# Patient Record
Sex: FEMALE | Race: WHITE | NOT HISPANIC OR LATINO | Employment: OTHER | URBAN - METROPOLITAN AREA
[De-identification: names, ages, dates, MRNs, and addresses within clinical notes are randomized per-mention and may not be internally consistent; named-entity substitution may affect disease eponyms.]

---

## 2017-04-28 ENCOUNTER — HISTORICAL (OUTPATIENT)
Dept: INTERNAL MEDICINE | Facility: CLINIC | Age: 62
End: 2017-04-28

## 2017-04-28 LAB
ABS NEUT (OLG): 4.68 X10(3)/MCL (ref 2.1–9.2)
ALBUMIN SERPL-MCNC: 4 GM/DL (ref 3.4–5)
ALBUMIN/GLOB SERPL: 1 RATIO (ref 1–2)
ALP SERPL-CCNC: 89 UNIT/L (ref 20–120)
ALT SERPL-CCNC: 30 UNIT/L
AST SERPL-CCNC: 25 UNIT/L
BASOPHILS # BLD AUTO: 0.02 X10(3)/MCL
BASOPHILS NFR BLD AUTO: 0 % (ref 0–1)
BILIRUB SERPL-MCNC: 0.8 MG/DL
BILIRUBIN DIRECT+TOT PNL SERPL-MCNC: <0.1 MG/DL
BILIRUBIN DIRECT+TOT PNL SERPL-MCNC: >0.7 MG/DL
BUN SERPL-MCNC: 12 MG/DL (ref 7–25)
CALCIUM SERPL-MCNC: 9.3 MG/DL (ref 8.4–10.3)
CHLORIDE SERPL-SCNC: 102 MMOL/L (ref 96–110)
CHOLEST SERPL-MCNC: 203 MG/DL
CHOLEST/HDLC SERPL: 4.2 {RATIO} (ref 0–4.4)
CO2 SERPL-SCNC: 27 MMOL/L (ref 24–32)
CREAT SERPL-MCNC: 0.6 MG/DL (ref 0.7–1.1)
EOSINOPHIL # BLD AUTO: 0.13 X10(3)/MCL
EOSINOPHIL NFR BLD AUTO: 2 % (ref 0–5)
ERYTHROCYTE [DISTWIDTH] IN BLOOD BY AUTOMATED COUNT: 11.9 % (ref 11.5–14.5)
EST. AVERAGE GLUCOSE BLD GHB EST-MCNC: 189 MG/DL
GLOBULIN SER-MCNC: 3.1 GM/ML (ref 2.3–3.5)
GLUCOSE SERPL-MCNC: 160 MG/DL (ref 65–99)
HBA1C MFR BLD: 8.2 % (ref 4.7–5.6)
HCT VFR BLD AUTO: 41.4 % (ref 35–46)
HDLC SERPL-MCNC: 48 MG/DL
HGB BLD-MCNC: 13.9 GM/DL (ref 12–16)
IMM GRANULOCYTES # BLD AUTO: 0.01 10*3/UL
IMM GRANULOCYTES NFR BLD AUTO: 0 %
LDLC SERPL CALC-MCNC: 112 MG/DL (ref 0–130)
LYMPHOCYTES # BLD AUTO: 3 X10(3)/MCL
LYMPHOCYTES NFR BLD AUTO: 36 % (ref 15–40)
MCH RBC QN AUTO: 31 PG (ref 26–34)
MCHC RBC AUTO-ENTMCNC: 33.6 GM/DL (ref 31–37)
MCV RBC AUTO: 92.2 FL (ref 80–100)
MONOCYTES # BLD AUTO: 0.51 X10(3)/MCL
MONOCYTES NFR BLD AUTO: 6 % (ref 4–12)
NEUTROPHILS # BLD AUTO: 4.68 X10(3)/MCL
NEUTROPHILS NFR BLD AUTO: 56 X10(3)/MCL
PLATELET # BLD AUTO: 312 X10(3)/MCL (ref 130–400)
PMV BLD AUTO: 9.9 FL (ref 7.4–10.4)
POTASSIUM SERPL-SCNC: 4.4 MMOL/L (ref 3.6–5.2)
PROT SERPL-MCNC: 7.1 GM/DL (ref 6–8)
RBC # BLD AUTO: 4.49 X10(6)/MCL (ref 4–5.2)
SODIUM SERPL-SCNC: 138 MMOL/L (ref 135–146)
TRIGL SERPL-MCNC: 217 MG/DL
TSH SERPL-ACNC: 1 MIU/L (ref 0.5–5)
VLDLC SERPL CALC-MCNC: 43 MG/DL
WBC # SPEC AUTO: 8.4 X10(3)/MCL (ref 4.5–11)

## 2017-06-20 LAB
COLOR STL: NORMAL
CONSISTENCY STL: NORMAL
HEMOCCULT SP2 STL QL: NEGATIVE

## 2017-06-21 LAB
COLOR STL: NORMAL
CONSISTENCY STL: NORMAL
HEMOCCULT SP1 STL QL: NEGATIVE

## 2017-06-22 LAB
COLOR STL: NORMAL
CONSISTENCY STL: NORMAL

## 2017-06-23 ENCOUNTER — HISTORICAL (OUTPATIENT)
Dept: INTERNAL MEDICINE | Facility: CLINIC | Age: 62
End: 2017-06-23

## 2018-05-02 ENCOUNTER — HISTORICAL (OUTPATIENT)
Dept: ADMINISTRATIVE | Facility: HOSPITAL | Age: 63
End: 2018-05-02

## 2018-05-02 LAB
EST. AVERAGE GLUCOSE BLD GHB EST-MCNC: 206 MG/DL
HBA1C MFR BLD: 8.8 % (ref 4.2–6.3)

## 2018-05-23 ENCOUNTER — HISTORICAL (OUTPATIENT)
Dept: INTERNAL MEDICINE | Facility: CLINIC | Age: 63
End: 2018-05-23

## 2018-06-19 ENCOUNTER — HISTORICAL (OUTPATIENT)
Dept: INTERNAL MEDICINE | Facility: CLINIC | Age: 63
End: 2018-06-19

## 2018-08-17 ENCOUNTER — HISTORICAL (OUTPATIENT)
Dept: INTERNAL MEDICINE | Facility: CLINIC | Age: 63
End: 2018-08-17

## 2018-08-17 LAB
ABS NEUT (OLG): 7.63 X10(3)/MCL (ref 2.1–9.2)
ALBUMIN SERPL-MCNC: 3.7 GM/DL (ref 3.4–5)
ALBUMIN/GLOB SERPL: 1 RATIO (ref 1–2)
ALP SERPL-CCNC: 122 UNIT/L (ref 45–117)
ALT SERPL-CCNC: 33 UNIT/L (ref 12–78)
AST SERPL-CCNC: 17 UNIT/L (ref 15–37)
BASOPHILS # BLD AUTO: 0.03 X10(3)/MCL
BASOPHILS NFR BLD AUTO: 0 %
BILIRUB SERPL-MCNC: 0.8 MG/DL (ref 0.2–1)
BILIRUBIN DIRECT+TOT PNL SERPL-MCNC: 0.2 MG/DL
BILIRUBIN DIRECT+TOT PNL SERPL-MCNC: 0.6 MG/DL
BUN SERPL-MCNC: 11 MG/DL (ref 7–18)
CALCIUM SERPL-MCNC: 9.2 MG/DL (ref 8.5–10.1)
CHLORIDE SERPL-SCNC: 101 MMOL/L (ref 98–107)
CHOLEST SERPL-MCNC: 169 MG/DL
CHOLEST/HDLC SERPL: 3.7 {RATIO} (ref 0–4.4)
CO2 SERPL-SCNC: 31 MMOL/L (ref 21–32)
CREAT SERPL-MCNC: 0.7 MG/DL (ref 0.6–1.3)
EOSINOPHIL # BLD AUTO: 0.14 X10(3)/MCL
EOSINOPHIL NFR BLD AUTO: 1 %
ERYTHROCYTE [DISTWIDTH] IN BLOOD BY AUTOMATED COUNT: 11.6 % (ref 11.5–14.5)
EST. AVERAGE GLUCOSE BLD GHB EST-MCNC: 189 MG/DL
GLOBULIN SER-MCNC: 4.3 GM/ML (ref 2.3–3.5)
GLUCOSE SERPL-MCNC: 188 MG/DL (ref 74–106)
HBA1C MFR BLD: 8.2 % (ref 4.2–6.3)
HCT VFR BLD AUTO: 43.1 % (ref 35–46)
HDLC SERPL-MCNC: 46 MG/DL
HGB BLD-MCNC: 14.3 GM/DL (ref 12–16)
IMM GRANULOCYTES # BLD AUTO: 0.03 10*3/UL
IMM GRANULOCYTES NFR BLD AUTO: 0 %
LDLC SERPL CALC-MCNC: 78 MG/DL (ref 0–130)
LYMPHOCYTES # BLD AUTO: 3.8 X10(3)/MCL
LYMPHOCYTES NFR BLD AUTO: 31 % (ref 13–40)
MCH RBC QN AUTO: 31 PG (ref 26–34)
MCHC RBC AUTO-ENTMCNC: 33.2 GM/DL (ref 31–37)
MCV RBC AUTO: 93.3 FL (ref 80–100)
MONOCYTES # BLD AUTO: 0.58 X10(3)/MCL
MONOCYTES NFR BLD AUTO: 5 % (ref 4–12)
NEUTROPHILS # BLD AUTO: 7.63 X10(3)/MCL
NEUTROPHILS NFR BLD AUTO: 63 X10(3)/MCL
PLATELET # BLD AUTO: 330 X10(3)/MCL (ref 130–400)
PMV BLD AUTO: 10.1 FL (ref 7.4–10.4)
POTASSIUM SERPL-SCNC: 4.7 MMOL/L (ref 3.5–5.1)
PROT SERPL-MCNC: 8 GM/DL (ref 6.4–8.2)
RBC # BLD AUTO: 4.62 X10(6)/MCL (ref 4–5.2)
SODIUM SERPL-SCNC: 138 MMOL/L (ref 136–145)
TRIGL SERPL-MCNC: 225 MG/DL
VLDLC SERPL CALC-MCNC: 45 MG/DL
WBC # SPEC AUTO: 12.2 X10(3)/MCL (ref 4.5–11)

## 2019-09-10 ENCOUNTER — HISTORICAL (OUTPATIENT)
Dept: RADIOLOGY | Facility: HOSPITAL | Age: 64
End: 2019-09-10

## 2019-09-10 LAB
ABS NEUT (OLG): 5.07 X10(3)/MCL (ref 2.1–9.2)
ALBUMIN SERPL-MCNC: 3.8 GM/DL (ref 3.4–5)
ALBUMIN/GLOB SERPL: 1.1 RATIO (ref 1.1–2)
ALP SERPL-CCNC: 109 UNIT/L (ref 45–117)
ALT SERPL-CCNC: 40 UNIT/L (ref 12–78)
AST SERPL-CCNC: 18 UNIT/L (ref 15–37)
BASOPHILS # BLD AUTO: 0 X10(3)/MCL (ref 0–0.2)
BASOPHILS NFR BLD AUTO: 0 %
BILIRUB SERPL-MCNC: 0.6 MG/DL (ref 0.2–1)
BILIRUBIN DIRECT+TOT PNL SERPL-MCNC: 0.1 MG/DL
BILIRUBIN DIRECT+TOT PNL SERPL-MCNC: 0.5 MG/DL
BUN SERPL-MCNC: 11 MG/DL (ref 7–18)
CALCIUM SERPL-MCNC: 9.2 MG/DL (ref 8.5–10.1)
CHLORIDE SERPL-SCNC: 106 MMOL/L (ref 98–107)
CHOLEST SERPL-MCNC: 203 MG/DL
CHOLEST/HDLC SERPL: 4.2 {RATIO} (ref 0–4.4)
CO2 SERPL-SCNC: 33 MMOL/L (ref 21–32)
CREAT SERPL-MCNC: 0.7 MG/DL (ref 0.6–1.3)
EOSINOPHIL # BLD AUTO: 0.2 X10(3)/MCL (ref 0–0.9)
EOSINOPHIL NFR BLD AUTO: 2 %
ERYTHROCYTE [DISTWIDTH] IN BLOOD BY AUTOMATED COUNT: 12.4 % (ref 11.5–14.5)
EST. AVERAGE GLUCOSE BLD GHB EST-MCNC: 160 MG/DL
GLOBULIN SER-MCNC: 3.5 GM/ML (ref 2.3–3.5)
GLUCOSE SERPL-MCNC: 163 MG/DL (ref 74–106)
HBA1C MFR BLD: 7.2 % (ref 4.2–6.3)
HCT VFR BLD AUTO: 43.3 % (ref 35–46)
HDLC SERPL-MCNC: 48 MG/DL (ref 40–59)
HGB BLD-MCNC: 14.2 GM/DL (ref 12–16)
IMM GRANULOCYTES # BLD AUTO: 0.02 10*3/UL
IMM GRANULOCYTES NFR BLD AUTO: 0 %
LDLC SERPL CALC-MCNC: 109 MG/DL
LYMPHOCYTES # BLD AUTO: 3.9 X10(3)/MCL (ref 0.6–4.6)
LYMPHOCYTES NFR BLD AUTO: 40 %
MCH RBC QN AUTO: 31 PG (ref 26–34)
MCHC RBC AUTO-ENTMCNC: 32.8 GM/DL (ref 31–37)
MCV RBC AUTO: 94.5 FL (ref 80–100)
MONOCYTES # BLD AUTO: 0.6 X10(3)/MCL (ref 0.1–1.3)
MONOCYTES NFR BLD AUTO: 6 %
NEUTROPHILS # BLD AUTO: 5.07 X10(3)/MCL (ref 2.1–9.2)
NEUTROPHILS NFR BLD AUTO: 52 %
PLATELET # BLD AUTO: 297 X10(3)/MCL (ref 130–400)
PMV BLD AUTO: 9.3 FL (ref 7.4–10.4)
POTASSIUM SERPL-SCNC: 5 MMOL/L (ref 3.5–5.1)
PROT SERPL-MCNC: 7.3 GM/DL (ref 6.4–8.2)
RBC # BLD AUTO: 4.58 X10(6)/MCL (ref 4–5.2)
SODIUM SERPL-SCNC: 140 MMOL/L (ref 136–145)
TRIGL SERPL-MCNC: 229 MG/DL
VLDLC SERPL CALC-MCNC: 46 MG/DL
WBC # SPEC AUTO: 9.8 X10(3)/MCL (ref 4.5–11)

## 2019-10-07 ENCOUNTER — HISTORICAL (OUTPATIENT)
Dept: ADMINISTRATIVE | Facility: HOSPITAL | Age: 64
End: 2019-10-07

## 2020-02-26 ENCOUNTER — HISTORICAL (OUTPATIENT)
Dept: ADMINISTRATIVE | Facility: HOSPITAL | Age: 65
End: 2020-02-26

## 2020-03-05 ENCOUNTER — HISTORICAL (OUTPATIENT)
Dept: SURGERY | Facility: HOSPITAL | Age: 65
End: 2020-03-05

## 2020-03-18 ENCOUNTER — HISTORICAL (OUTPATIENT)
Dept: ADMINISTRATIVE | Facility: HOSPITAL | Age: 65
End: 2020-03-18

## 2020-11-11 ENCOUNTER — HISTORICAL (OUTPATIENT)
Dept: ADMINISTRATIVE | Facility: HOSPITAL | Age: 65
End: 2020-11-11

## 2020-11-11 LAB
FLUAV AG UPPER RESP QL IA.RAPID: NEGATIVE
FLUBV AG UPPER RESP QL IA.RAPID: NEGATIVE

## 2020-11-19 ENCOUNTER — HISTORICAL (OUTPATIENT)
Dept: ADMINISTRATIVE | Facility: HOSPITAL | Age: 65
End: 2020-11-19

## 2020-11-19 LAB
FLUAV AG UPPER RESP QL IA.RAPID: NEGATIVE
FLUBV AG UPPER RESP QL IA.RAPID: NEGATIVE

## 2020-11-30 ENCOUNTER — HISTORICAL (OUTPATIENT)
Dept: ADMINISTRATIVE | Facility: HOSPITAL | Age: 65
End: 2020-11-30

## 2020-11-30 LAB — SARS-COV-2 RNA RESP QL NAA+PROBE: DETECTED

## 2020-12-09 ENCOUNTER — HISTORICAL (OUTPATIENT)
Dept: ADMINISTRATIVE | Facility: HOSPITAL | Age: 65
End: 2020-12-09

## 2021-05-12 ENCOUNTER — HISTORICAL (OUTPATIENT)
Dept: ADMINISTRATIVE | Facility: HOSPITAL | Age: 66
End: 2021-05-12

## 2021-05-12 LAB
ABS NEUT (OLG): 8.2 X10(3)/MCL (ref 2.1–9.2)
ALBUMIN SERPL-MCNC: 4.1 GM/DL (ref 3.4–4.8)
ALBUMIN/GLOB SERPL: 1.2 RATIO (ref 1.1–2)
ALP SERPL-CCNC: 99 UNIT/L (ref 40–150)
ALT SERPL-CCNC: 18 UNIT/L (ref 0–55)
APPEARANCE, UA: CLEAR
AST SERPL-CCNC: 19 UNIT/L (ref 5–34)
BACTERIA #/AREA URNS AUTO: ABNORMAL /HPF
BASOPHILS # BLD AUTO: 0 X10(3)/MCL (ref 0–0.2)
BASOPHILS NFR BLD AUTO: 0 %
BILIRUB SERPL-MCNC: 0.6 MG/DL
BILIRUB UR QL STRIP: NEGATIVE
BILIRUBIN DIRECT+TOT PNL SERPL-MCNC: 0.2 MG/DL (ref 0–0.5)
BILIRUBIN DIRECT+TOT PNL SERPL-MCNC: 0.4 MG/DL (ref 0–0.8)
BUN SERPL-MCNC: 13.2 MG/DL (ref 9.8–20.1)
CALCIUM SERPL-MCNC: 10.1 MG/DL (ref 8.4–10.2)
CHLORIDE SERPL-SCNC: 106 MMOL/L (ref 98–107)
CHOLEST SERPL-MCNC: 159 MG/DL
CHOLEST/HDLC SERPL: 3 {RATIO} (ref 0–5)
CO2 SERPL-SCNC: 26 MMOL/L (ref 23–31)
COLOR UR: NORMAL
CREAT SERPL-MCNC: 0.88 MG/DL (ref 0.55–1.02)
CREAT UR-MCNC: 81.4 MG/DL (ref 45–106)
DEPRECATED CALCIDIOL+CALCIFEROL SERPL-MC: 30.1 NG/ML (ref 30–80)
EOSINOPHIL # BLD AUTO: 0.2 X10(3)/MCL (ref 0–0.9)
EOSINOPHIL NFR BLD AUTO: 2 %
ERYTHROCYTE [DISTWIDTH] IN BLOOD BY AUTOMATED COUNT: 12.2 % (ref 11.5–14.5)
EST. AVERAGE GLUCOSE BLD GHB EST-MCNC: 188.6 MG/DL
GLOBULIN SER-MCNC: 3.4 GM/DL (ref 2.4–3.5)
GLUCOSE (UA): NEGATIVE
GLUCOSE SERPL-MCNC: 146 MG/DL (ref 82–115)
HBA1C MFR BLD: 8.2 %
HCT VFR BLD AUTO: 41 % (ref 35–46)
HDLC SERPL-MCNC: 49 MG/DL (ref 35–60)
HGB BLD-MCNC: 13.8 GM/DL (ref 12–16)
HGB UR QL STRIP: NEGATIVE
HYALINE CASTS #/AREA URNS LPF: ABNORMAL /LPF
IMM GRANULOCYTES # BLD AUTO: 0.03 10*3/UL
IMM GRANULOCYTES NFR BLD AUTO: 0 %
KETONES UR QL STRIP: NEGATIVE
LDLC SERPL CALC-MCNC: 83 MG/DL (ref 50–140)
LEUKOCYTE ESTERASE UR QL STRIP: NEGATIVE
LYMPHOCYTES # BLD AUTO: 4.2 X10(3)/MCL (ref 0.6–4.6)
LYMPHOCYTES NFR BLD AUTO: 32 %
MCH RBC QN AUTO: 30.4 PG (ref 26–34)
MCHC RBC AUTO-ENTMCNC: 33.7 GM/DL (ref 31–37)
MCV RBC AUTO: 90.3 FL (ref 80–100)
MICROALBUMIN UR-MCNC: 20.9 MG/L
MICROALBUMIN/CREAT RATIO PNL UR: 25.7 MG/GM CR (ref 0–30)
MONOCYTES # BLD AUTO: 0.7 X10(3)/MCL (ref 0.1–1.3)
MONOCYTES NFR BLD AUTO: 5 %
NEUTROPHILS # BLD AUTO: 8.2 X10(3)/MCL (ref 2.1–9.2)
NEUTROPHILS NFR BLD AUTO: 61 %
NITRITE UR QL STRIP: NEGATIVE
PH UR STRIP: 5.5 [PH] (ref 4.5–8)
PLATELET # BLD AUTO: 365 X10(3)/MCL (ref 130–400)
PMV BLD AUTO: 9.6 FL (ref 7.4–10.4)
POTASSIUM SERPL-SCNC: 3.7 MMOL/L (ref 3.5–5.1)
PROT SERPL-MCNC: 7.5 GM/DL (ref 5.8–7.6)
PROT UR QL STRIP: NEGATIVE
RBC # BLD AUTO: 4.54 X10(6)/MCL (ref 4–5.2)
RBC #/AREA URNS AUTO: ABNORMAL /HPF
SODIUM SERPL-SCNC: 140 MMOL/L (ref 136–145)
SP GR UR STRIP: 1.01 (ref 1–1.03)
SQUAMOUS #/AREA URNS LPF: ABNORMAL /LPF
TRIGL SERPL-MCNC: 137 MG/DL (ref 37–140)
UROBILINOGEN UR STRIP-ACNC: NORMAL
VIT B12 SERPL-MCNC: >2000 PG/ML (ref 213–816)
VLDLC SERPL CALC-MCNC: 27 MG/DL
WBC # SPEC AUTO: 13.4 X10(3)/MCL (ref 4.5–11)
WBC #/AREA URNS AUTO: ABNORMAL /HPF

## 2021-07-27 ENCOUNTER — HISTORICAL (OUTPATIENT)
Dept: RADIOLOGY | Facility: HOSPITAL | Age: 66
End: 2021-07-27

## 2022-04-11 ENCOUNTER — HISTORICAL (OUTPATIENT)
Dept: ADMINISTRATIVE | Facility: HOSPITAL | Age: 67
End: 2022-04-11

## 2022-04-27 VITALS
BODY MASS INDEX: 38.91 KG/M2 | HEIGHT: 60 IN | OXYGEN SATURATION: 97 % | DIASTOLIC BLOOD PRESSURE: 82 MMHG | SYSTOLIC BLOOD PRESSURE: 140 MMHG | WEIGHT: 198.19 LBS

## 2022-04-30 NOTE — H&P
Patient:   Ursula Sampson             MRN: [id number]            FIN: [id number]               Age:   64 years     Sex:  Female     :  1955   Associated Diagnoses:   None   Author:   Pedro Bunch MD      Chief Complaint   Right shoulder pain History of Present Illness Mrs Sampson is a 64-year-old right-hand-dominant female who presents to clinic today for assessment of her right shoulder pain. She has been seen in our primary care sports clinic for treatment up until this point. She has attempted to manage her pain with subacromial corticosteroid injections, physiotherapy, anti-inflammatory medications including NSAIDs and what sounds like a Medrol Dosepak as well as some topical anti-inflammatories. Unfortunately she is still unhappy with the the pain in her right shoulder with regular activity. She reports that she has pain worse with any range of motion of the shoulder. It is especially bad when she required to do any lifting.    The patient is currently not working. She is the primary caregiver for her  who has parkinsonian-like symptoms. 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 Review of Systems Constitutional: no fever, fatigue, weakness  Eye: no vision loss, eye redness, drainage, or pain  ENMT: no sore throat, ear pain, sinus pain/congestion, nasal congestion/drainage  Respiratory: no cough, no wheezing, no shortness of breath  Cardiovascular: no chest pain, no palpitations, no edema    Physical Exam   Vitals & Measurements HT: 158 cm WT: 94 kg BMI: 37.65 Right arm: The patient neurovascular type distally into the right hand however she does complain of intermittent numbness radiating from her neck down to her hand. This is not very bothersome for her. She has a range of motion of the right shoulder in flexion proximal 120 degrees abduction proximal 120 degrees external rotation to 25 degrees. There is  pain at the end range of motion. There is crepitus with pendulum range of motion of the shoulder. Minimal tenderness palpation around the shoulder. Skin is intact.    Left arm: Patient is neurovascularly intact distally in the left hand. She has a full and painless range of motion of the left elbow wrist and hand. She does have a somewhat limited range of motion of the left shoulder however she can flex to 150 degrees abduct 150 degrees externally rotate 35 degrees and reports that the shoulder feels much better than the contralateral side. Assessment/Plan 1. Rotator cuff arthropathy M12.819 I had a long discussion with the patient regarding her current symptoms. At this time she has exhausted conservative care including injections, physical therapy, and medications. She still is unhappy with the function of her left shoulder and wishes to proceed with surgery. The risk benefits outcomes alternatives of conservative versus operative management discussed with patient in clinic today. Informed consent was obtained for a right shoulder reverse total shoulder arthroplasty. This will be booked at a mutually beneficial time. We will send the patient for preoperative clearance prior to surgery.

## 2022-05-04 NOTE — HISTORICAL [facility] CERNER
This is a historical note converted from Anand. Formatting and pictures may have been removed.  Please reference Anand for original formatting and attached multimedia. Chief Complaint  new pt referral R rotator cuff full thickness tear  History of Present Illness  63 Years?old?RHD?Female?non-smoker hx of DM, HTN, HLD, obesity?presents to?[facility]?for?initial?visit?for?R?shoulder pain?x months.? She has significant night time pain and props her arm on a pillow.? She has a lot of pain using her tablet and when she swipes.? MRI was done by PCP- results attached below.  DOI: months  Occupation: unemployed, volunteers at a second hand store, does a lot of housework  VIBHA: unsure of an exact mechanism or an exact moment; she did re-rerack her shower towel on a bar and felt a sharp pain  Previously seen by: PCP;?Urgent Care;  Previous treatment:?ice, Toradol IM, po steroids with few days of relief;  Previous injuries:?denies  Fam Hx of Arthritis:??no  Current pain level: ?8/10,?tight, gripping, worsened with use, and alleviated with rest.  Associated Symptoms:?no numbness or tingling;?no swelling;?no skin changes:?no weakness;?no decrease in ROM  Review of Systems  Constitutional: no fever, no chills, no weight loss  CV: no swelling, no edema  Resp: no SOB, wheezing  GI: no fecal incontinence  : no urinary retention, no urinary incontinence  Skin: no rash, no wound  Neuro: no numbness/tingling, no weakness, no saddle anesthesia  MSK: as above  Psych: no depression, no anxiety  Heme/Lymph: no easy bruising, no easy bleeding, no lymphadenopathy  Immuno: no MRSA history  Physical Exam  Vitals & Measurements  T:?36.7? ?C (Oral)? HR:?72(Peripheral)? RR:?17? BP:?149/82?  HT:?152?cm? WT:?94.7?kg? BMI:?40.99?  Right Shoulder  Inspection:??no swelling noted,??no erythema,??no bruising,??no atrophy  Palpation:?TTP at distal footprint of supraspinatus  ?? Active Passive   Forward Flexion (0-180) 150 180   Extension (0-60)  40 60   Abduction (0-180) 120 180   Adduction (0-140) 100 140   Internal Rotation?(0-90) greater troc T4   External Rotation (0-60) 25 60   ?  Strength: FF 5/5, Abduction 5/5, Adduction 5/5, Internal Rotation 5/5, External Rotation 5/5  ?   Special Testing:  Empty can Test:??negative  Lift-off Test:??unable to tolerate  Drop Arm Test:??negative  Neers Test:??negative  Vu Test:??negative  Speeds Test:??positive  Yergasons Test: +++  OBriens Test:??negative  Cross Body Adduction Test:??+++  ?  Left Shoulder  Inspection:??no swelling noted,??no erythema,??no bruising,??no atrophy  Palpation:??non tender  ?? Active Passive   Forward Flexion (0-180) 180 180   Extension (0-60) 60 60   Abduction (0-180) 180 180   Adduction (0-140) 140 140   Internal Rotation?(0-90) T7 T4   External Rotation (0-60) 70 60   ?  Strength: FF 5/5, Abduction 5/5, Adduction 5/5, Internal Rotation 5/5, External Rotation 5/5  ?   Special Testing:  Empty can Test:??negative  Lift-off Test:??negative  Drop Arm Test:??negative  Neers Test:??negative  Vu Test:??negative  Speeds Test:??negative  Yergasons Test: negative  OBriens Test:??negative  Cross Body Adduction Test: ?negative  ?  Post Injection?RightShoulder:  Increased ROM in all planes  Improvement in strength 5/5  ?   General: well developed; ?well nourished; cooperative; obese  PSYCH: alert and oriented?x 3 with?appropriate mood and affect  SKIN: inspection and palpation of skin and soft tissue normal; no scars noted on upper/lower extremities  CV: vascular integrity noted; +2 symmetrical pulses, no edema  NEURO: sensation intact by light touch; DTRs +2 bilateral and symmetrical  LYMPH: no LAD noted  Assessment/Plan  Arthritis of right acromioclavicular joint?M19.011,?Arthritis of right glenohumeral joint?M19.011  - As below  - Can direct CSI at this under US guidance at a future visit  ?  Obesity?E66.9  - Discussed diet modifications and significant weight loss with portion control  and food selection  - Goal is to lose 10% of the total body weight  ?  Right rotator cuff tear?M75.101  - Radiological studies ordered and?reviewed by me, my independent interpretation attached  - Discussed case with Ortho Team; will treat as RC tendinitis as the MRI appears to be only a partial tear; There is minimal fatty atrophy therefore the cuff still has acceptable makeup to be? good surgical candidate in the future; if no relief with the CSI today, we will allow Ortho Res team to evaluate patient and see if surgery is an agreeable decision; if relief with the CSI, then we can continue to perform CSIs and even direct them later at  or [facility] if indicated  -?CSI?performed today to?R Shoulder Subacromial, consented, tolerated well, NVI following injection and improvement in symptoms  -?Activity as tolerated  - PT x 12wks, 3 times weekly, HEP, NSAIDs/Tyl prn, Ice/Heat prn; Rx for Naproxen  - Follow up?8wks, see?above  ?   Problem List/Past Medical History  Ongoing  Cervical stenosis of spinal canal  Depression  Depression  DM - Diabetes mellitus  HLD (hyperlipidemia)  HTN (hypertension)  HTN (hypertension)  Obesity  Historical  No qualifying data  Procedure/Surgical History   section  Cholecystectomy   Medications  aspirin 81 mg oral tablet, CHEWABLE, 81 mg= 1 tab(s), Oral, Daily, 4 refills  atorvastatin 20 mg oral tablet, 20 mg= 1 tab(s), Oral, Daily,? ?Not Taking, Completed Rx  diclofenac sodium 75 mg oral delayed release tablet, 75 mg= 1 tab(s), Oral, BID, PRN, 4 refills  Glucometer, See Instructions  Glucometer Lancets & Testing Strips, See Instructions, 3 refills  Lipitor 40 mg oral tablet, 40 mg= 1 tab(s), Oral, Daily, 3 refills,? ?Not taking  Lipitor 40 mg oral tablet, 40 mg= 1 tab(s), Oral, Daily, 3 refills  lisinopril 20 mg oral tablet, 20 mg= 1 tab(s), Oral, Daily, 3 refills  metFORMIN 1000 mg oral tablet, 1000 mg= 1 tab(s), Oral, BID, 4 refills  methylPREDNISolone 4 mg oral tab,? ?Not Taking,  Completed Rx  Neurontin 300 mg oral capsule, 300 mg= 1 cap(s), Oral, BID, 3 refills  Paxil 20 mg oral tablet, 20 mg= 1 tab(s), Oral, Daily, 3 refills  terbinafine 1% topical cream, 1 garo, TOP, BID, 1 refills  ? Glucometer, See Instructions  Allergies  E-Mycin?(THROAT PAIN)  Social History  Abuse/Neglect  No, No, Yes, 10/07/2019  Alcohol - Medium Risk, 02/24/2015  Current, 1-2 times per year, 08/28/2019  Employment/School  Unemployed, 08/28/2019  Exercise  Exercise type: Yardwork., 08/28/2019  Home/Environment  Lives with Spouse, Son., 08/28/2019  Nutrition/Health  Regular, 08/28/2019  Sexual  Sexual orientation: Straight or heterosexual. Gender Identity Identifies as female., 10/07/2019  Substance Use - Denies Substance Abuse, 02/24/2015  Never, 08/28/2019  Tobacco - Denies Tobacco Use, 02/24/2015  Never (less than 100 in lifetime), N/A, 10/07/2019  Family History  Alcoholism.: Brother.  Arthritis: Mother and Father.  Cardiac arrhythmia.: Mother.  Cataract.: Mother and Father.  Colon cancer: Mother.  Dementia: Mother.  Depression.: Mother.  Diabetes mellitus type 1.: Mother.  Drug addiction.: Brother.  Heart disease: Mother.  Hypertension.: Brother.  Kidney disease: Father.  Mental illness: Mother.  Mitral valve disorder.: Mother.  Pneumonia.: Father.  Tobacco user: Father and Brother.  Immunizations  Vaccine Date Status   tetanus/diphtheria/pertussis, acel(Tdap) 08/28/2019 Given   influenza virus vaccine, inactivated 10/30/2018 Given   influenza virus vaccine, inactivated 09/21/2017 Given   pneumococcal 23-polyvalent vaccine 10/31/2016 Given   influenza virus vaccine, inactivated 10/31/2016 Given   Health Maintenance  Health Maintenance  ???Pending?(in the next year)  ??? ??OverDue  ??? ? ? ?Diabetes Maintenance-Urine Dipstick due??12/26/17??and every 1??year(s)  ??? ??Due?  ??? ? ? ?Cervical Cancer Screening due??09/29/19??and every 3??year(s)  ??? ? ? ?Influenza Vaccine due??10/07/19??and every?  ??? ? ? ?Zoster  Vaccine due??10/07/19??and every 100??year(s)  ??? ??Due In Future?  ??? ? ? ?Aspirin Therapy for CVD Prevention not due until??10/30/19??and every 1??year(s)  ??? ? ? ?Alcohol Misuse Screening not due until??01/01/20??and every 1??year(s)  ??? ? ? ?Obesity Screening not due until??01/01/20??and every 1??year(s)  ??? ? ? ?Breast Cancer Screening not due until??06/18/20??and every 2??year(s)  ??? ? ? ?Diabetes Maintenance-Foot Exam not due until??08/27/20??and every 1??year(s)  ??? ? ? ?ADL Screening not due until??08/28/20??and every 1??year(s)  ??? ? ? ?Diabetes Maintenance-Eye Exam not due until??09/03/20??and every 1??year(s)  ??? ? ? ?Colorectal Screening not due until??09/08/20??and every 1??year(s)  ??? ? ? ?Diabetes Maintenance-Fasting Lipid Profile not due until??09/09/20??and every 1??year(s)  ??? ? ? ?Diabetes Maintenance-HgbA1c not due until??09/09/20??and every 1??year(s)  ??? ? ? ?Hypertension Management-BMP not due until??09/09/20??and every 1??year(s)  ??? ? ? ?Diabetes Maintenance-Serum Creatinine not due until??09/10/20??and every 1??year(s)  ??? ? ? ?Blood Pressure Screening not due until??10/06/20??and every 1??year(s)  ??? ? ? ?Body Mass Index Check not due until??10/06/20??and every 1??year(s)  ??? ? ? ?Depression Screening not due until??10/06/20??and every 1??year(s)  ??? ? ? ?Hypertension Management-Blood Pressure not due until??10/06/20??and every 1??year(s)  ???Satisfied?(in the past 1 year)  ??? ??Satisfied?  ??? ? ? ?ADL Screening on??08/28/19.??Satisfied by Lauri Boss RN  ??? ? ? ?Alcohol Misuse Screening on??10/07/19.??Satisfied by Hypolite, Stephanie F  ??? ? ? ?Aspirin Therapy for CVD Prevention on??10/30/18.??Satisfied by Da Celaya MD  ??? ? ? ?Blood Pressure Screening on??10/07/19.??Satisfied by Hypolite, Stephanie F  ??? ? ? ?Body Mass Index Check on??10/07/19.??Satisfied by Hypolite, Stephanie F  ??? ? ? ?Colorectal Screening on??09/09/19.??Satisfied by Daiana Tucker  ??? ? ?  ?Depression Screening on??10/07/19.??Satisfied by Stephanie Sotelo  ??? ? ? ?Diabetes Maintenance-Fasting Lipid Profile on??09/10/19.??Satisfied by Marly Soto  ??? ? ? ?Diabetes Maintenance-HgbA1c on??09/10/19.??Satisfied by Marly Soto  ??? ? ? ?Diabetes Maintenance-Serum Creatinine on??09/10/19.??Satisfied by Marly Soto  ??? ? ? ?Diabetes Maintenance-Eye Exam on??09/04/19.??Satisfied by Gerry Quick MD  ??? ? ? ?Diabetes Maintenance-Foot Exam on??08/28/19.??Satisfied by Joe Waldrop DO  ??? ? ? ?Diabetes Screening on??09/10/19.??Satisfied by Marly Soto  ??? ? ? ?Hypertension Management-Blood Pressure on??10/07/19.??Satisfied by Stephanie Sotelo  ??? ? ? ?Influenza Vaccine on??10/30/18.??Satisfied by Rachel Anglin RN  ??? ? ? ?Lipid Screening on??09/10/19.??Satisfied by Marly Soto  ??? ? ? ?Obesity Screening on??10/07/19.??Satisfied by Stephanie Sotelo  ??? ? ? ?Tetanus Vaccine on??08/28/19.??Satisfied by Gera REVELES, Lauri Zhou  ?  Diagnostic Results  MRI R Shoulder 9/10/19:  IMPRESSION:  ?  Full-thickness tears of the distal supraspinatus and distal  subscapularis tendon. Torn fibers are retracted to the level of the  glenohumeral joint line. Mild associated atrophy is present.  ?  High grade partial-thickness articular surface tear to the  infraspinatus tendon  ?  Superior subluxation humeral head  ?  Patulous subcoracoid recess which may have ruptured. Trace fluid is  seen deep to the subscapularis muscle belly.  ?  XR R Shoulder 10/7/19:  No acute fracture or dislocation present.

## 2022-05-04 NOTE — HISTORICAL [facility] CERNER
This is a historical note converted from Anand. Formatting and pictures may have been removed.  Please reference Anand for original formatting and attached multimedia. Chief Complaint  P/O Rt RTC repair  History of Present Illness  Ms Sampson is a very pleasant 64-year-old female?return to clinic today?at her 2-week follow-up?for her right reverse total shoulder arthroplasty.? She is been doing very well since leaving hospital. ?Today she has no acute complaints.  Review of Systems  Constitutional:?no fever, fatigue, weakness  Eye:?no vision loss, eye redness, drainage, or pain  ENMT:?no sore throat, ear pain, sinus pain/congestion, nasal congestion/drainage  Respiratory:?no cough, no wheezing, no shortness of breath  Cardiovascular:?no chest pain, no palpitations, no edema  Gastrointestinal:?no nausea, vomiting, or diarrhea. No abdominal pain  ?  ?  Physical Exam  Vitals & Measurements  HT:?147.32?cm? WT:?93.7?kg? BMI:?43.17?  Right arm: Patient neurovascular type distally in the right hand. ?She has a limited range of motion the right shoulder secondary to prolonged immobilization. ?Her wounds are clean dry and intact with no evidence of infection.  Assessment/Plan  1.?Status post arthroscopy of right shoulder?Z98.890  ?Patient will begin a home exercise program.? Based on the?CO VID outbreak the patient reports that she wants to avoid?formal physiotherapy.? I gave her instructions on which exercises she should be doing at home.? I would like to see her back in 4 weeks time for repeat clinical examination. ?This may be done by a telemedicine visit.  2.?HTN (hypertension)?I10   Medications  amoxicillin-clavulanate 875 mg-125 mg oral tablet, 1 tab(s), Oral, BID,? ?Not Taking, Completed Rx  aspirin 81 mg oral tablet, CHEWABLE, 81 mg= 1 tab(s), Oral, BID  atorvastatin 20 mg oral tablet, 20 mg= 1 tab(s), Oral, Daily, 1 refills,? ?Not taking  diclofenac sodium 75 mg oral delayed release tablet, 75 mg= 1 tab(s), Oral,  BID, PRN, 6 refills,? ?Not Taking, Completed Rx  fluticasone 50 mcg/inh nasal spray, 1 spray(s), Nasal, Daily,? ?Not Taking, Completed Rx  Glucometer, See Instructions  Glucometer Lancets & Testing Strips, See Instructions, 3 refills  Lipitor 40 mg oral tablet, 40 mg= 1 tab(s), Oral, Daily, 3 refills  lisinopril 20 mg oral tablet, 20 mg= 1 tab(s), Oral, Daily, 3 refills  lisinopril 20 mg oral tablet, 20 mg= 1 tab(s), Oral, Daily, 1 refills,? ?Not taking  loratadine 10 mg oral tablet, 10 mg= 1 tab(s), Oral, Daily,? ?Not Taking, Completed Rx  metFORMIN 1000 mg oral tablet, 1000 mg= 1 tab(s), Oral, BID, 4 refills  metFORMIN 1000 mg oral tablet, 1000 mg= 1 tab(s), Oral, BID, 1 refills,? ?Not taking  mupirocin 2% topical ointment, 1 garo, TOP, BID,? ?Not Taking, Completed Rx  naproxen 500 mg oral tablet, 500 mg= 1 tab(s), Oral, BID  Neurontin 300 mg oral capsule, See Instructions,? ?Not taking  Paxil 20 mg oral tablet, 20 mg= 1 tab(s), Oral, Daily, 6 refills,? ?Not taking  Paxil 20 mg oral tablet, 20 mg= 1 tab(s), Oral, Daily, 1 refills  Percocet 5/325 oral tablet, 1 tab(s), Oral, q4hr, PRN,? ?Not taking: new script  Percocet 5/325 oral tablet, 1 tab(s), Oral, q4hr, PRN  terbinafine 250 mg oral tablet, 250 mg= 1 tab(s), Oral, Daily  ? Glucometer, See Instructions  Diagnostic Results  X-ray right shoulder [3 views: AP, lateral,?axillary]: Performed today reviewed with patient:?Excellent alignment of reverse total shoulder arthroplasty hardware. ?No evidence of complications.

## 2022-05-25 RX ORDER — LISINOPRIL 30 MG/1
30 TABLET ORAL DAILY
Qty: 30 TABLET | Refills: 2 | OUTPATIENT
Start: 2022-05-25

## 2022-05-25 RX ORDER — LISINOPRIL 30 MG/1
30 TABLET ORAL DAILY
COMMUNITY
Start: 2022-01-03 | End: 2022-06-22 | Stop reason: SDUPTHER

## 2022-06-22 DIAGNOSIS — F41.9 ANXIETY: Primary | ICD-10-CM

## 2022-06-22 DIAGNOSIS — I10 HYPERTENSION, UNSPECIFIED TYPE: ICD-10-CM

## 2022-06-22 RX ORDER — PAROXETINE HYDROCHLORIDE 20 MG/1
20 TABLET, FILM COATED ORAL DAILY
COMMUNITY
Start: 2022-01-03 | End: 2022-06-22 | Stop reason: SDUPTHER

## 2022-06-22 NOTE — TELEPHONE ENCOUNTER
----- Message from Heidy Slade sent at 6/22/2022 12:48 PM CDT -----  Regarding: Dr. Anderson-Refill Request  Patient states she has been requesting that refills of Lisinopril 30 mg and Paxil 20 mg be sent to [facility] in [place] x three weeks. Please call patient to advise. Thanks

## 2022-06-27 DIAGNOSIS — Z79.4 TYPE 2 DIABETES MELLITUS WITHOUT COMPLICATION, WITH LONG-TERM CURRENT USE OF INSULIN: Primary | ICD-10-CM

## 2022-06-27 DIAGNOSIS — E11.9 TYPE 2 DIABETES MELLITUS WITHOUT COMPLICATION, WITH LONG-TERM CURRENT USE OF INSULIN: Primary | ICD-10-CM

## 2022-06-27 RX ORDER — LISINOPRIL 30 MG/1
30 TABLET ORAL DAILY
Qty: 30 TABLET | Refills: 1 | Status: SHIPPED | OUTPATIENT
Start: 2022-06-27 | End: 2022-08-23 | Stop reason: SDUPTHER

## 2022-06-27 RX ORDER — PAROXETINE HYDROCHLORIDE 20 MG/1
20 TABLET, FILM COATED ORAL DAILY
Qty: 30 TABLET | Refills: 1 | Status: SHIPPED | OUTPATIENT
Start: 2022-06-27 | End: 2022-08-23 | Stop reason: SDUPTHER

## 2022-06-27 RX ORDER — METFORMIN HYDROCHLORIDE 1000 MG/1
1000 TABLET ORAL 2 TIMES DAILY
COMMUNITY
Start: 2022-01-03 | End: 2022-06-27 | Stop reason: SDUPTHER

## 2022-06-27 NOTE — TELEPHONE ENCOUNTER
----- Message from Liliana Ariza sent at 6/27/2022  3:22 PM CDT -----  Regarding: Monica- Medication Refills       Provider: Dr Anderson     Preferred Pharmacy:  [facility]     Last Visit: 02/15/2022    Next Visit: 08/31/2022    Patient's Contact Number: [phone]       1. Name of Medication: Metformin     Dosage:    Comments:           Thanks for all that you do,  Liliana

## 2022-06-30 RX ORDER — METFORMIN HYDROCHLORIDE 1000 MG/1
1000 TABLET ORAL 2 TIMES DAILY
Qty: 90 TABLET | Refills: 1 | Status: SHIPPED | OUTPATIENT
Start: 2022-06-30 | End: 2023-01-17 | Stop reason: SDUPTHER

## 2022-08-23 DIAGNOSIS — I10 HYPERTENSION, UNSPECIFIED TYPE: ICD-10-CM

## 2022-08-23 DIAGNOSIS — F41.9 ANXIETY: ICD-10-CM

## 2022-08-23 RX ORDER — LISINOPRIL 30 MG/1
30 TABLET ORAL DAILY
Qty: 30 TABLET | Refills: 3 | Status: SHIPPED | OUTPATIENT
Start: 2022-08-23 | End: 2023-01-17 | Stop reason: SDUPTHER

## 2022-08-23 RX ORDER — PAROXETINE HYDROCHLORIDE 20 MG/1
20 TABLET, FILM COATED ORAL DAILY
Qty: 30 TABLET | Refills: 3 | Status: SHIPPED | OUTPATIENT
Start: 2022-08-23 | End: 2023-01-17 | Stop reason: SDUPTHER

## 2022-10-03 RX ORDER — GLYBURIDE 1.25 MG/1
1.25 TABLET ORAL DAILY
COMMUNITY
Start: 2022-02-15 | End: 2022-10-27 | Stop reason: SDUPTHER

## 2022-10-03 RX ORDER — GLYBURIDE 1.25 MG/1
1.25 TABLET ORAL DAILY
Qty: 30 TABLET | Refills: 2 | Status: CANCELLED | OUTPATIENT
Start: 2022-10-03

## 2022-10-27 RX ORDER — GLYBURIDE 1.25 MG/1
1.25 TABLET ORAL DAILY
Qty: 60 TABLET | Refills: 3 | Status: SHIPPED | OUTPATIENT
Start: 2022-10-27 | End: 2023-01-17 | Stop reason: SDUPTHER

## 2023-01-17 ENCOUNTER — OFFICE VISIT (OUTPATIENT)
Dept: INTERNAL MEDICINE | Facility: CLINIC | Age: 68
End: 2023-01-17
Payer: COMMERCIAL

## 2023-01-17 ENCOUNTER — LAB VISIT (OUTPATIENT)
Dept: LAB | Facility: HOSPITAL | Age: 68
End: 2023-01-17
Payer: COMMERCIAL

## 2023-01-17 VITALS
WEIGHT: 205.81 LBS | TEMPERATURE: 98 F | RESPIRATION RATE: 18 BRPM | BODY MASS INDEX: 40.4 KG/M2 | HEART RATE: 57 BPM | SYSTOLIC BLOOD PRESSURE: 156 MMHG | DIASTOLIC BLOOD PRESSURE: 80 MMHG | HEIGHT: 60 IN

## 2023-01-17 DIAGNOSIS — Z12.39 ENCOUNTER FOR SCREENING FOR MALIGNANT NEOPLASM OF BREAST, UNSPECIFIED SCREENING MODALITY: ICD-10-CM

## 2023-01-17 DIAGNOSIS — E11.9 TYPE 2 DIABETES MELLITUS WITHOUT COMPLICATION, WITHOUT LONG-TERM CURRENT USE OF INSULIN: ICD-10-CM

## 2023-01-17 DIAGNOSIS — E11.9 TYPE 2 DIABETES MELLITUS WITHOUT COMPLICATION, WITH LONG-TERM CURRENT USE OF INSULIN: ICD-10-CM

## 2023-01-17 DIAGNOSIS — F41.9 ANXIETY: ICD-10-CM

## 2023-01-17 DIAGNOSIS — Z12.11 SCREENING FOR COLON CANCER: ICD-10-CM

## 2023-01-17 DIAGNOSIS — Z79.4 TYPE 2 DIABETES MELLITUS WITHOUT COMPLICATION, WITH LONG-TERM CURRENT USE OF INSULIN: ICD-10-CM

## 2023-01-17 DIAGNOSIS — Z23 NEED FOR PNEUMOCOCCAL VACCINATION: Primary | ICD-10-CM

## 2023-01-17 DIAGNOSIS — Z23 NEED FOR INFLUENZA VACCINATION: ICD-10-CM

## 2023-01-17 DIAGNOSIS — Z13.820 SCREENING FOR OSTEOPOROSIS: ICD-10-CM

## 2023-01-17 DIAGNOSIS — I10 HYPERTENSION, UNSPECIFIED TYPE: ICD-10-CM

## 2023-01-17 LAB
ALBUMIN SERPL-MCNC: 4 G/DL (ref 3.4–4.8)
ALBUMIN/GLOB SERPL: 1.2 RATIO (ref 1.1–2)
ALP SERPL-CCNC: 114 UNIT/L (ref 40–150)
ALT SERPL-CCNC: 42 UNIT/L (ref 0–55)
APPEARANCE UR: CLEAR
AST SERPL-CCNC: 25 UNIT/L (ref 5–34)
BACTERIA #/AREA URNS AUTO: ABNORMAL /HPF
BASOPHILS # BLD AUTO: 0.02 X10(3)/MCL (ref 0–0.2)
BASOPHILS NFR BLD AUTO: 0.2 %
BILIRUB UR QL STRIP.AUTO: NEGATIVE MG/DL
BILIRUBIN DIRECT+TOT PNL SERPL-MCNC: 0.8 MG/DL
BUN SERPL-MCNC: 11.7 MG/DL (ref 9.8–20.1)
CALCIUM SERPL-MCNC: 9.7 MG/DL (ref 8.4–10.2)
CHLORIDE SERPL-SCNC: 105 MMOL/L (ref 98–107)
CHOLEST SERPL-MCNC: 194 MG/DL
CHOLEST/HDLC SERPL: 4 {RATIO} (ref 0–5)
CO2 SERPL-SCNC: 26 MMOL/L (ref 23–31)
COLOR UR AUTO: ABNORMAL
CREAT SERPL-MCNC: 0.86 MG/DL (ref 0.55–1.02)
CREAT UR-MCNC: 122 MG/DL (ref 47–110)
EOSINOPHIL # BLD AUTO: 0.09 X10(3)/MCL (ref 0–0.9)
EOSINOPHIL NFR BLD AUTO: 0.9 %
ERYTHROCYTE [DISTWIDTH] IN BLOOD BY AUTOMATED COUNT: 12.2 % (ref 11.5–17)
GFR SERPLBLD CREATININE-BSD FMLA CKD-EPI: >60 MLS/MIN/1.73/M2
GLOBULIN SER-MCNC: 3.4 GM/DL (ref 2.4–3.5)
GLUCOSE SERPL-MCNC: 171 MG/DL (ref 82–115)
GLUCOSE UR QL STRIP.AUTO: ABNORMAL MG/DL
HBA1C MFR BLD: 8.1 %
HCT VFR BLD AUTO: 40 % (ref 37–47)
HDLC SERPL-MCNC: 51 MG/DL (ref 35–60)
HGB BLD-MCNC: 13.4 GM/DL (ref 12–16)
HYALINE CASTS #/AREA URNS LPF: ABNORMAL /LPF
IMM GRANULOCYTES # BLD AUTO: 0.04 X10(3)/MCL (ref 0–0.04)
IMM GRANULOCYTES NFR BLD AUTO: 0.4 %
KETONES UR QL STRIP.AUTO: NEGATIVE MG/DL
LDLC SERPL CALC-MCNC: 109 MG/DL (ref 50–140)
LEUKOCYTE ESTERASE UR QL STRIP.AUTO: 75 UNIT/L
LYMPHOCYTES # BLD AUTO: 3.53 X10(3)/MCL (ref 0.6–4.6)
LYMPHOCYTES NFR BLD AUTO: 34.7 %
MCH RBC QN AUTO: 30.2 PG
MCHC RBC AUTO-ENTMCNC: 33.5 MG/DL (ref 33–36)
MCV RBC AUTO: 90.1 FL (ref 80–94)
MICROALBUMIN UR-MCNC: 18.7 UG/ML
MICROALBUMIN/CREAT RATIO PNL UR: 15.3 MG/GM CR (ref 0–30)
MONOCYTES # BLD AUTO: 0.68 X10(3)/MCL (ref 0.1–1.3)
MONOCYTES NFR BLD AUTO: 6.7 %
MUCOUS THREADS URNS QL MICRO: ABNORMAL /LPF
NEUTROPHILS # BLD AUTO: 5.81 X10(3)/MCL (ref 2.1–9.2)
NEUTROPHILS NFR BLD AUTO: 57.1 %
NITRITE UR QL STRIP.AUTO: NEGATIVE
NRBC BLD AUTO-RTO: 0 %
PH UR STRIP.AUTO: 7 [PH]
PLATELET # BLD AUTO: 379 X10(3)/MCL (ref 130–400)
PMV BLD AUTO: 10.2 FL (ref 7.4–10.4)
POTASSIUM SERPL-SCNC: 4 MMOL/L (ref 3.5–5.1)
PROT SERPL-MCNC: 7.4 GM/DL (ref 5.8–7.6)
PROT UR QL STRIP.AUTO: NEGATIVE MG/DL
RBC # BLD AUTO: 4.44 X10(6)/MCL (ref 4.2–5.4)
RBC #/AREA URNS AUTO: ABNORMAL /HPF
RBC UR QL AUTO: NEGATIVE UNIT/L
SODIUM SERPL-SCNC: 140 MMOL/L (ref 136–145)
SP GR UR STRIP.AUTO: 1.02
SQUAMOUS #/AREA URNS LPF: ABNORMAL /HPF
TRIGL SERPL-MCNC: 170 MG/DL (ref 37–140)
TSH SERPL-ACNC: 1.24 UIU/ML (ref 0.35–4.94)
UNIDENT CRYS #/AREA URNS HPF: ABNORMAL /HPF
UROBILINOGEN UR STRIP-ACNC: NORMAL MG/DL
VIT B12 SERPL-MCNC: >2000 PG/ML (ref 213–816)
VLDLC SERPL CALC-MCNC: 34 MG/DL
WBC # SPEC AUTO: 10.2 X10(3)/MCL (ref 4.5–11.5)
WBC #/AREA URNS AUTO: ABNORMAL /HPF

## 2023-01-17 PROCEDURE — 85025 COMPLETE CBC W/AUTO DIFF WBC: CPT

## 2023-01-17 PROCEDURE — 80053 COMPREHEN METABOLIC PANEL: CPT

## 2023-01-17 PROCEDURE — G0009 ADMIN PNEUMOCOCCAL VACCINE: HCPCS | Mod: PBBFAC

## 2023-01-17 PROCEDURE — 99214 OFFICE O/P EST MOD 30 MIN: CPT | Mod: PBBFAC

## 2023-01-17 PROCEDURE — 82607 VITAMIN B-12: CPT

## 2023-01-17 PROCEDURE — G0008 ADMIN INFLUENZA VIRUS VAC: HCPCS | Mod: PBBFAC

## 2023-01-17 PROCEDURE — 81001 URINALYSIS AUTO W/SCOPE: CPT

## 2023-01-17 PROCEDURE — 90653 IIV ADJUVANT VACCINE IM: CPT | Mod: PBBFAC

## 2023-01-17 PROCEDURE — 84443 ASSAY THYROID STIM HORMONE: CPT

## 2023-01-17 PROCEDURE — 36415 COLL VENOUS BLD VENIPUNCTURE: CPT

## 2023-01-17 PROCEDURE — 90677 PCV20 VACCINE IM: CPT | Mod: PBBFAC

## 2023-01-17 PROCEDURE — 83036 HEMOGLOBIN GLYCOSYLATED A1C: CPT | Mod: PBBFAC

## 2023-01-17 PROCEDURE — 80061 LIPID PANEL: CPT

## 2023-01-17 PROCEDURE — 82043 UR ALBUMIN QUANTITATIVE: CPT

## 2023-01-17 RX ORDER — GLYBURIDE 1.25 MG/1
1.25 TABLET ORAL DAILY
Qty: 60 TABLET | Refills: 3 | Status: SHIPPED | OUTPATIENT
Start: 2023-01-17 | End: 2023-05-09 | Stop reason: SDUPTHER

## 2023-01-17 RX ORDER — NAPROXEN SODIUM 220 MG/1
81 TABLET, FILM COATED ORAL DAILY
Qty: 60 TABLET | Refills: 3 | Status: SHIPPED | OUTPATIENT
Start: 2023-01-17 | End: 2023-05-09 | Stop reason: SDUPTHER

## 2023-01-17 RX ORDER — ALBUTEROL SULFATE 0.83 MG/ML
SOLUTION RESPIRATORY (INHALATION)
COMMUNITY
Start: 2022-12-29

## 2023-01-17 RX ORDER — ATORVASTATIN CALCIUM 40 MG/1
40 TABLET, FILM COATED ORAL NIGHTLY
COMMUNITY
Start: 2022-12-02 | End: 2023-01-17 | Stop reason: SDUPTHER

## 2023-01-17 RX ORDER — PAROXETINE HYDROCHLORIDE 20 MG/1
20 TABLET, FILM COATED ORAL DAILY
Qty: 60 TABLET | Refills: 3 | Status: SHIPPED | OUTPATIENT
Start: 2023-01-17 | End: 2023-05-09 | Stop reason: SDUPTHER

## 2023-01-17 RX ORDER — MECOBALAMIN 1000 MCG
1000 TABLET,CHEWABLE ORAL DAILY
COMMUNITY
Start: 2022-02-15

## 2023-01-17 RX ORDER — NAPROXEN SODIUM 220 MG/1
81 TABLET, FILM COATED ORAL DAILY
COMMUNITY
Start: 2022-02-15 | End: 2023-01-17 | Stop reason: SDUPTHER

## 2023-01-17 RX ORDER — LISINOPRIL 30 MG/1
30 TABLET ORAL DAILY
Qty: 60 TABLET | Refills: 3 | Status: SHIPPED | OUTPATIENT
Start: 2023-01-17 | End: 2023-04-11 | Stop reason: SDUPTHER

## 2023-01-17 RX ORDER — ATORVASTATIN CALCIUM 40 MG/1
40 TABLET, FILM COATED ORAL NIGHTLY
Qty: 60 TABLET | Refills: 3 | Status: SHIPPED | OUTPATIENT
Start: 2023-01-17 | End: 2023-04-11 | Stop reason: SDUPTHER

## 2023-01-17 RX ORDER — LACTOBACILLUS ACIDOPHILUS 500MM CELL
1 CAPSULE ORAL 2 TIMES DAILY
COMMUNITY
Start: 2021-05-12

## 2023-01-17 RX ORDER — CETIRIZINE HYDROCHLORIDE 10 MG/1
10 TABLET ORAL DAILY
COMMUNITY
Start: 2021-12-28

## 2023-01-17 RX ORDER — METFORMIN HYDROCHLORIDE 1000 MG/1
1000 TABLET ORAL 2 TIMES DAILY
Qty: 90 TABLET | Refills: 3 | Status: SHIPPED | OUTPATIENT
Start: 2023-01-17 | End: 2023-04-11 | Stop reason: SDUPTHER

## 2023-01-17 NOTE — PROGRESS NOTES
[facility] INTERNAL MEDICINE  OUTPATIENT OFFICE VISIT NOTE    SUBJECTIVE:      Chief Complaint: Follow-up (Post bronchitis fatigue after [place], denies any other concerns at this time)       HPI: Ursula Sampson is a 67 y.o. yo female w/ PMH of T2DM, HLD, HTN, CKD Stage II who presents for follow-up. Patient states she has been feeling well sine her last visit in 2022 and has no complaints at this time.  She had an episode of bronchitis in 2022 and was given antibiotics and steroids.  She has been feeling well since with no lingering symptoms.  She states her depression is well controlled on Paxil and denies any SI/HI.  She denies any recent episodes of F/C, N/V, chest pain, sob, diarrhea, abdominal pain, dysuria, headaches, or vision changes.  She states she is currently working to change her diet and is attempting to make life style changes.  She has no history of tobacco use and denies any other recreational drug use.  She was out of her medications for the last 3 days but states she is usually very compliant with regimen.     Past Medical History:  HTN, HLD, T2DM, CKD Stage 2    Past Surgical History:  Hx of cholecystectomy  Hx of D&C in    Hx of  1984    Right shoulder replacement in 2020     Family History:  Mother had hx colon cancer, diabetes, sick sinus syndrome, and dementia     Social History:   reports that she has never smoked. She has never used smokeless tobacco. She reports current alcohol use of about 2.0 standard drinks per week. She reports that she does not use drugs.     Allergies:  is allergic to erythromycin and sulfamethoxazole-trimethoprim.     Home Medications:  Prior to Admission medications    Medication Sig Start Date End Date Taking? Authorizing Provider   albuterol (PROVENTIL) 2.5 mg /3 mL (0.083 %) nebulizer solution SMARTSIG:3 Milliliter(s) Via Nebulizer Every 4-6 Hours PRN 22  Yes Historical Provider   aspirin 81 MG Chew Take 81 mg by mouth  "Daily. 2/15/22  Yes Historical Provider   cetirizine (ZYRTEC) 10 MG tablet Take 10 tablets by mouth Daily. 12/28/21  Yes Historical Provider   glyBURIDE (DIABETA) 1.25 MG Tab Take 1 tablet (1.25 mg total) by mouth once daily. 10/27/22  Yes Elena Mcgee MD   Lactobacillus acidophilus 500 million cell Cap Take 1 capsule by mouth 2 (two) times a day. 5/12/21  Yes Historical Provider   mecobalamin, vitamin B12, (B12 ACTIVE) 1,000 mcg Chew Take 1,000 mcg by mouth Daily. 2/15/22  Yes Historical Provider   atorvastatin (LIPITOR) 40 MG tablet Take 40 mg by mouth every evening. 12/2/22   Historical Provider   lisinopriL (PRINIVIL,ZESTRIL) 30 MG tablet Take 1 tablet (30 mg total) by mouth once daily.  Patient not taking: Reported on 1/17/2023 8/23/22   Elena Mcgee MD   metFORMIN (GLUCOPHAGE) 1000 MG tablet Take 1 tablet (1,000 mg total) by mouth 2 (two) times daily.  Patient not taking: Reported on 1/17/2023 6/30/22   Danny Mcdonald,    paroxetine (PAXIL) 20 MG tablet Take 1 tablet (20 mg total) by mouth once daily.  Patient not taking: Reported on 1/17/2023 8/23/22   Elena Mcgee MD     Review of Systems   Constitutional:  Negative for weight loss.   Eyes:  Negative for blurred vision.   Cardiovascular:  Negative for chest pain.   Neurological:  Negative for dizziness, tremors, seizures, weakness and headaches.   Endo/Heme/Allergies:  Positive for polydipsia.   Psychiatric/Behavioral:  The patient is not nervous/anxious.        OBJECTIVE:     Vital signs:   BP (!) 156/80 (BP Location: Left arm, Patient Position: Sitting, BP Method: Large (Manual))   Pulse (!) 57   Temp 98.4 °F (36.9 °C) (Oral)   Resp 18   Ht 4' 11.84" (1.52 m)   Wt 93.4 kg (205 lb 12.8 oz)   BMI 40.41 kg/m²      Physical Examination:  General: Patient resting comfortably, in no acute distress   Eye: PERRLA, EOMI, clear conjunctiva, eyelids normal  HENT: Head-normocephalic and atraumatic  Neck: full range of motion, no thyromegaly or " lymphadenopathy, trachea midline, supple, no palpable thyroid nodules  Respiratory: clear to auscultation bilaterally without wheezes, rales, rhonchi  Cardiovascular: regular rate and rhythm without murmurs.  No gallops or rubs no JVD.  Capillary refill within normal limits.  Gastrointestinal: soft, non-tender, non-distended with normal bowel sounds, without masses to palpation  Genitourinary: no CVA tenderness to palpation  Musculoskeletal: full range of motion of all extremities/spine without limitation or discomfort  Integumentary: no rashes or skin lesions present  Neurologic: no signs of peripheral neurological deficit, motor/sensory function intact  Psychiatric:  alert and oriented, cognitive function intact, cooperative with exam, good eye contact, judgement and insight intact, mood and affect full range.     Labs:  CMP:   Lab Results   Component Value Date    GLUCOSE 146 (H) 05/12/2021    CALCIUM 10.1 05/12/2021    ALBUMIN 4.1 05/12/2021     05/12/2021    K 3.7 05/12/2021    CO2 26 05/12/2021    BUN 13.2 05/12/2021    CREATININE 0.88 05/12/2021    ALKPHOS 99 05/12/2021    ALT 18 05/12/2021    AST 19 05/12/2021    BILITOT 0.6 05/12/2021      CBC:   Lab Results   Component Value Date    WBC 13.4 (H) 05/12/2021    HGB 13.8 05/12/2021    HCT 41.0 05/12/2021    MCV 90.3 05/12/2021    RDW 12.2 05/12/2021     FLP:   Lab Results   Component Value Date    CHOL 159 05/12/2021    HDL 49 05/12/2021    LDL 83.00 05/12/2021    TRIG 137 05/12/2021    TOTALCHOLEST 3 05/12/2021     DM:   Lab Results   Component Value Date    HGBA1C 8.2 (H) 05/12/2021    .6 05/12/2021    CREATININE 0.88 05/12/2021    CREATRANDUR 81.4 05/12/2021     Thyroid:   Lab Results   Component Value Date    TSH 1.00 04/28/2017     LFTs:   Lab Results   Component Value Date    LABPROT 7.5 05/12/2021    ALBUMIN 4.1 05/12/2021    AST 19 05/12/2021    ALT 18 05/12/2021    ALKPHOS 99 05/12/2021     Anemia:   Lab Results   Component Value Date     EITPDNSX49 >2,000 (H) 05/12/2021       ASSESSMENT & PLAN:     Diabetes  - Continue metformin 1000 bid and glyburide 1.25   - has been out of medications for the last 3 days   - A1c 8.2 in May 2021, today 8.1   - pt is not interested in changing medication regimen at this time, would like to attempt lifestyle modifications for better control of blood sugars   - will check A1c at next visit and consider change in medication regimen at that time.    HLD  - continue Atorvastatin 40  - last lipid panel 5/2021 wnl   - repeat ordered today for monitoring     HTN  CKD II  - continue lisinopril 30mg   - CMP, microalbumin/creatinine ratio ordered today for monitoring     Depression - well controlled   -Continue Paxil 20    Preventative Care  -Flu Vaccine: received today 1/17/23  -TDap Vaccine: 2019  -Herpes Zoster Vaccine: completed 2 dose shingles series today feb 2021  -Pneumovax: received 23 in 2016, giving 20 valent today   -Recent FIT Test: negative in 07/2021, reorder today   -Recent Colonoscopy: as above  -Lung Cancer Screening: never smoker  -DEXA Scan: ordering today   -Recent Pap Smear: referral for wellness sent  -Recent Mammogram: completed in 07/2021, birads 1 bilaterally, reordering today   -Diabetic Foot exam: will perform at next visit   -Diabetic Eye Exam: fundus check completed in 05/2021, referring to opthalmology   -Diabetic Nephropathy Screening: no microalbuminuria in 05/2021, reordered today   -completed covid vaccinations and booster in dec 2021    Return to clinic in 3 month(s).    Elena Mcgee MD  [facility], [facility]

## 2023-02-08 ENCOUNTER — HOSPITAL ENCOUNTER (OUTPATIENT)
Dept: RADIOLOGY | Facility: HOSPITAL | Age: 68
Discharge: HOME | End: 2023-02-08
Payer: COMMERCIAL

## 2023-02-08 DIAGNOSIS — Z13.820 SCREENING FOR OSTEOPOROSIS: ICD-10-CM

## 2023-02-08 DIAGNOSIS — Z12.39 ENCOUNTER FOR SCREENING FOR MALIGNANT NEOPLASM OF BREAST, UNSPECIFIED SCREENING MODALITY: ICD-10-CM

## 2023-02-08 PROCEDURE — 77067 SCR MAMMO BI INCL CAD: CPT | Mod: 26,,, | Performed by: RADIOLOGY

## 2023-02-08 PROCEDURE — 77063 MAMMO DIGITAL SCREENING BILAT WITH TOMO: ICD-10-PCS | Mod: 26,,, | Performed by: RADIOLOGY

## 2023-02-08 PROCEDURE — 77067 SCR MAMMO BI INCL CAD: CPT | Mod: TC

## 2023-02-08 PROCEDURE — 77067 MAMMO DIGITAL SCREENING BILAT WITH TOMO: ICD-10-PCS | Mod: 26,,, | Performed by: RADIOLOGY

## 2023-02-08 PROCEDURE — 77080 DXA BONE DENSITY AXIAL: CPT | Mod: TC

## 2023-02-08 PROCEDURE — 77063 BREAST TOMOSYNTHESIS BI: CPT | Mod: 26,,, | Performed by: RADIOLOGY

## 2023-04-11 DIAGNOSIS — Z79.4 TYPE 2 DIABETES MELLITUS WITHOUT COMPLICATION, WITH LONG-TERM CURRENT USE OF INSULIN: ICD-10-CM

## 2023-04-11 DIAGNOSIS — E11.9 TYPE 2 DIABETES MELLITUS WITHOUT COMPLICATION, WITH LONG-TERM CURRENT USE OF INSULIN: ICD-10-CM

## 2023-04-11 DIAGNOSIS — I10 HYPERTENSION, UNSPECIFIED TYPE: ICD-10-CM

## 2023-04-11 RX ORDER — LISINOPRIL 30 MG/1
30 TABLET ORAL DAILY
Qty: 60 TABLET | Refills: 3 | Status: SHIPPED | OUTPATIENT
Start: 2023-04-11 | End: 2023-05-08 | Stop reason: SDUPTHER

## 2023-04-11 RX ORDER — METFORMIN HYDROCHLORIDE 1000 MG/1
1000 TABLET ORAL 2 TIMES DAILY
Qty: 90 TABLET | Refills: 3 | Status: SHIPPED | OUTPATIENT
Start: 2023-04-11 | End: 2023-05-08 | Stop reason: SDUPTHER

## 2023-04-11 RX ORDER — ATORVASTATIN CALCIUM 40 MG/1
40 TABLET, FILM COATED ORAL NIGHTLY
Qty: 60 TABLET | Refills: 3 | Status: SHIPPED | OUTPATIENT
Start: 2023-04-11 | End: 2023-05-08 | Stop reason: SDUPTHER

## 2023-04-22 LAB — NONINV COLON CA DNA+OCC BLD SCRN STL QL: NORMAL

## 2023-05-08 DIAGNOSIS — E11.9 TYPE 2 DIABETES MELLITUS WITHOUT COMPLICATION, WITH LONG-TERM CURRENT USE OF INSULIN: ICD-10-CM

## 2023-05-08 DIAGNOSIS — I10 HYPERTENSION, UNSPECIFIED TYPE: ICD-10-CM

## 2023-05-08 DIAGNOSIS — Z79.4 TYPE 2 DIABETES MELLITUS WITHOUT COMPLICATION, WITH LONG-TERM CURRENT USE OF INSULIN: ICD-10-CM

## 2023-05-08 RX ORDER — METFORMIN HYDROCHLORIDE 1000 MG/1
1000 TABLET ORAL 2 TIMES DAILY
Qty: 90 TABLET | Refills: 3 | Status: SHIPPED | OUTPATIENT
Start: 2023-05-08 | End: 2023-05-09 | Stop reason: SDUPTHER

## 2023-05-08 RX ORDER — LISINOPRIL 30 MG/1
30 TABLET ORAL DAILY
Qty: 60 TABLET | Refills: 3 | Status: SHIPPED | OUTPATIENT
Start: 2023-05-08 | End: 2023-05-09 | Stop reason: SDUPTHER

## 2023-05-08 RX ORDER — ATORVASTATIN CALCIUM 40 MG/1
40 TABLET, FILM COATED ORAL NIGHTLY
Qty: 60 TABLET | Refills: 3 | Status: SHIPPED | OUTPATIENT
Start: 2023-05-08 | End: 2023-05-09 | Stop reason: SDUPTHER

## 2023-05-09 ENCOUNTER — OFFICE VISIT (OUTPATIENT)
Dept: INTERNAL MEDICINE | Facility: CLINIC | Age: 68
End: 2023-05-09
Payer: COMMERCIAL

## 2023-05-09 VITALS
OXYGEN SATURATION: 97 % | WEIGHT: 200.19 LBS | TEMPERATURE: 98 F | DIASTOLIC BLOOD PRESSURE: 82 MMHG | BODY MASS INDEX: 39.31 KG/M2 | RESPIRATION RATE: 20 BRPM | HEART RATE: 57 BPM | SYSTOLIC BLOOD PRESSURE: 136 MMHG

## 2023-05-09 DIAGNOSIS — E11.9 TYPE 2 DIABETES MELLITUS WITHOUT COMPLICATION, WITHOUT LONG-TERM CURRENT USE OF INSULIN: Primary | ICD-10-CM

## 2023-05-09 DIAGNOSIS — I10 HYPERTENSION, UNSPECIFIED TYPE: ICD-10-CM

## 2023-05-09 DIAGNOSIS — F41.9 ANXIETY: ICD-10-CM

## 2023-05-09 DIAGNOSIS — E11.9 TYPE 2 DIABETES MELLITUS WITHOUT COMPLICATION, WITH LONG-TERM CURRENT USE OF INSULIN: ICD-10-CM

## 2023-05-09 DIAGNOSIS — Z79.4 TYPE 2 DIABETES MELLITUS WITHOUT COMPLICATION, WITH LONG-TERM CURRENT USE OF INSULIN: ICD-10-CM

## 2023-05-09 LAB — HBA1C MFR BLD: 6.4 %

## 2023-05-09 PROCEDURE — 99213 OFFICE O/P EST LOW 20 MIN: CPT | Mod: PBBFAC

## 2023-05-09 PROCEDURE — 83036 HEMOGLOBIN GLYCOSYLATED A1C: CPT | Mod: PBBFAC

## 2023-05-09 RX ORDER — LISINOPRIL 30 MG/1
30 TABLET ORAL DAILY
Qty: 60 TABLET | Refills: 3 | Status: SHIPPED | OUTPATIENT
Start: 2023-05-09 | End: 2023-10-13 | Stop reason: SDUPTHER

## 2023-05-09 RX ORDER — OXYBUTYNIN CHLORIDE 5 MG/1
5 TABLET ORAL 3 TIMES DAILY
Qty: 90 TABLET | Refills: 11 | Status: SHIPPED | OUTPATIENT
Start: 2023-05-09 | End: 2024-05-08

## 2023-05-09 RX ORDER — LANCETS 33 GAUGE
1 EACH MISCELLANEOUS DAILY
COMMUNITY
Start: 2023-01-17

## 2023-05-09 RX ORDER — GLYBURIDE 1.25 MG/1
1.25 TABLET ORAL DAILY
Qty: 60 TABLET | Refills: 3 | Status: SHIPPED | OUTPATIENT
Start: 2023-05-09

## 2023-05-09 RX ORDER — LANCETS 30 GAUGE
1 EACH MISCELLANEOUS DAILY
COMMUNITY
Start: 2023-01-17

## 2023-05-09 RX ORDER — PAROXETINE HYDROCHLORIDE 20 MG/1
20 TABLET, FILM COATED ORAL DAILY
Qty: 60 TABLET | Refills: 3 | Status: SHIPPED | OUTPATIENT
Start: 2023-05-09

## 2023-05-09 RX ORDER — METFORMIN HYDROCHLORIDE 1000 MG/1
1000 TABLET ORAL 2 TIMES DAILY
Qty: 90 TABLET | Refills: 3 | Status: SHIPPED | OUTPATIENT
Start: 2023-05-09

## 2023-05-09 RX ORDER — ATORVASTATIN CALCIUM 40 MG/1
40 TABLET, FILM COATED ORAL NIGHTLY
Qty: 60 TABLET | Refills: 3 | Status: SHIPPED | OUTPATIENT
Start: 2023-05-09

## 2023-05-09 RX ORDER — NAPROXEN SODIUM 220 MG/1
81 TABLET, FILM COATED ORAL DAILY
Qty: 60 TABLET | Refills: 3 | Status: SHIPPED | OUTPATIENT
Start: 2023-05-09

## 2023-05-09 NOTE — PROGRESS NOTES
[facility] INTERNAL MEDICINE  OUTPATIENT OFFICE VISIT NOTE    SUBJECTIVE:      Chief Complaint: Follow-up (Medication Refills . Bladder weakness)       HPI: Ursula Sampson is a 67 y.o. yo female w/ PMH of T2DM, HLD, HTN, CKD Stage II who presents for follow-up. Patient endorsing symptoms likely associated with urgency incontinence.  States she often has episodes where she will suddenly need to urinate and has a hard time holding her urine.  She denies any leakage of urine with sneezing, coughing, or valsalva maneuvers.  She states these symptoms have been present for many years but she feels like they are getting worse.  She denies any dysuria, hematuria, discharge, or frequency.  Otherwise, patient is doing well and has no other complaints at this time.  She states she has been attempting lifestyle changes and has been working hard to change her diet.  She has noticed approx 15 pound weight loss since prior visit.  She denies any recent F/C, N/V, diarrhea, constipation, abdominal pain, chest pain, or sob.  She denies any tobacco, alcohol, or recreational drug use.     Past Medical History:  HTN, HLD, T2DM, CKD Stage 2    Past Surgical History:  Hx of cholecystectomy  Hx of D&C in    Hx of      Right shoulder replacement in 2020     Family History:  Mother had hx colon cancer, diabetes, sick sinus syndrome, and dementia     Social History:   reports that she has never smoked. She has never used smokeless tobacco. She reports current alcohol use of about 2.0 standard drinks per week. She reports that she does not use drugs.     Allergies:  is allergic to erythromycin and sulfamethoxazole-trimethoprim.     Home Medications:  Prior to Admission medications    Medication Sig Start Date End Date Taking? Authorizing Provider   albuterol (PROVENTIL) 2.5 mg /3 mL (0.083 %) nebulizer solution SMARTSIG:3 Milliliter(s) Via Nebulizer Every 4-6 Hours PRN 22  Yes Historical Provider   aspirin 81 MG Chew  Take 81 mg by mouth Daily. 2/15/22  Yes Historical Provider   cetirizine (ZYRTEC) 10 MG tablet Take 10 tablets by mouth Daily. 12/28/21  Yes Historical Provider   glyBURIDE (DIABETA) 1.25 MG Tab Take 1 tablet (1.25 mg total) by mouth once daily. 10/27/22  Yes Elena Mcgee MD   Lactobacillus acidophilus 500 million cell Cap Take 1 capsule by mouth 2 (two) times a day. 5/12/21  Yes Historical Provider   mecobalamin, vitamin B12, (B12 ACTIVE) 1,000 mcg Chew Take 1,000 mcg by mouth Daily. 2/15/22  Yes Historical Provider   atorvastatin (LIPITOR) 40 MG tablet Take 40 mg by mouth every evening. 12/2/22   Historical Provider   lisinopriL (PRINIVIL,ZESTRIL) 30 MG tablet Take 1 tablet (30 mg total) by mouth once daily.  Patient not taking: Reported on 1/17/2023 8/23/22   Elena Mcgee MD   metFORMIN (GLUCOPHAGE) 1000 MG tablet Take 1 tablet (1,000 mg total) by mouth 2 (two) times daily.  Patient not taking: Reported on 1/17/2023 6/30/22   Danny Mcdonald,    paroxetine (PAXIL) 20 MG tablet Take 1 tablet (20 mg total) by mouth once daily.  Patient not taking: Reported on 1/17/2023 8/23/22   Elena Mcgee MD     Review of Systems   Constitutional:  Negative for weight loss.   Eyes:  Negative for blurred vision.   Cardiovascular:  Negative for chest pain.   Neurological:  Negative for dizziness, tremors, seizures, weakness and headaches.   Endo/Heme/Allergies:  Positive for polydipsia.   Psychiatric/Behavioral:  The patient is not nervous/anxious.        OBJECTIVE:     Vital signs:   /82 (BP Location: Left arm, Patient Position: Sitting, BP Method: Large (Automatic))   Pulse (!) 57   Temp 98.2 °F (36.8 °C) (Oral)   Resp 20   Wt 90.8 kg (200 lb 3.2 oz)   SpO2 97%   BMI 39.31 kg/m²      Physical Examination:  General: Patient resting comfortably, in no acute distress   Eye: PERRLA, EOMI, clear conjunctiva, eyelids normal  HENT: Head-normocephalic and atraumatic  Neck: full range of motion, no thyromegaly  or lymphadenopathy, trachea midline, supple, no palpable thyroid nodules  Respiratory: clear to auscultation bilaterally without wheezes, rales, rhonchi  Cardiovascular: regular rate and rhythm without murmurs.  No gallops or rubs no JVD.  Capillary refill within normal limits.  Gastrointestinal: soft, non-tender, non-distended with normal bowel sounds, without masses to palpation  Genitourinary: no CVA tenderness to palpation  Musculoskeletal: full range of motion of all extremities/spine without limitation or discomfort  Integumentary: no rashes or skin lesions present  Neurologic: no signs of peripheral neurological deficit, motor/sensory function intact  Psychiatric:  alert and oriented, cognitive function intact, cooperative with exam, good eye contact, judgement and insight intact, mood and affect full range.   Foot exam: no lesions or ulcers present, sensation intact, pulses 2+, toenails appear well maintained, skin is not dry or cracked    Labs:  CMP:   Lab Results   Component Value Date    GLUCOSE 171 (H) 01/17/2023    CALCIUM 9.7 01/17/2023    ALBUMIN 4.0 01/17/2023     01/17/2023    K 4.0 01/17/2023    CO2 26 01/17/2023    BUN 11.7 01/17/2023    CREATININE 0.86 01/17/2023    ALKPHOS 114 01/17/2023    ALT 42 01/17/2023    AST 25 01/17/2023    BILITOT 0.8 01/17/2023      CBC:   Lab Results   Component Value Date    WBC 10.2 01/17/2023    HGB 13.4 01/17/2023    HCT 40.0 01/17/2023    MCV 90.1 01/17/2023    RDW 12.2 01/17/2023     FLP:   Lab Results   Component Value Date    CHOL 194 01/17/2023    HDL 51 01/17/2023    .00 01/17/2023    TRIG 170 (H) 01/17/2023    TOTALCHOLEST 4 01/17/2023     DM:   Lab Results   Component Value Date    HGBA1C 8.2 (H) 05/12/2021    .6 05/12/2021    CREATININE 0.86 01/17/2023    CREATRANDUR 122.0 (H) 01/17/2023     Thyroid:   Lab Results   Component Value Date    TSH 1.237 01/17/2023     LFTs:   Lab Results   Component Value Date    LABPROT 7.4 01/17/2023     ALBUMIN 4.0 01/17/2023    AST 25 01/17/2023    ALT 42 01/17/2023    ALKPHOS 114 01/17/2023     Anemia:   Lab Results   Component Value Date    NOPHZIPS30 >2,000 (H) 01/17/2023       ASSESSMENT & PLAN:     Diabetes  - Continue metformin 1000 bid and glyburide 1.25   - A1c last visit 8.1, today is 6.4   - pt has been attempting lifestyle modifications over the last 3 months and has lost approx 15 pounds. If her A1c continued to decrease and she continues to lose weight can consider decreasing dose or removing medication from regimen.      HLD  - continue Atorvastatin 40  - last lipid panel 1/17/2023 shows cholesterol wnl but elevated triglycerides.   - patient attempting lifestyle modifications, will continue to monitor     HTN  CKD II  - continue lisinopril 30mg   - CMP, microalbumin/creatinine ratio wnl on 1/17/2023     Depression - well controlled   -Continue Paxil 20  -has been on this medication since 2012 after her  passed away.  Had conversation with patient today about discontinuing as it is not intended to be long-term therapy.  Patient states she would feel more comfortable staying on the medication as she is worried about reoccurrence of depression and still struggles with personal issues that she believes may exacerbate her symptoms if she does not continue with medication.     Urgency Incontinence  -starting oxybutynin 5mg today for treatment  -will re-evaluate at next visit and consider uptitrating dose   -can consider referral to urology in the future if symptoms persist or worsen      Preventative Care  -Flu Vaccine: received 1/17/23  -TDap Vaccine: 2019  -Herpes Zoster Vaccine: completed 2 dose shingles series today feb 2021  -Pneumovax: received 23 in 2016, giving 20 valent today   -Recent FIT Test: negative in 07/2021, pt is awaiting repeat test as she accidentally sent the wrong sample   -Recent Colonoscopy: as above  -Lung Cancer Screening: never smoker  -DEXA Scan: negative  2/8/2023  -Recent Pap Smear: refusing   -Recent Mammogram: BIRADS 1 2/9/2023   -Diabetic Foot exam: performed today, 5/9/2023  -Diabetic Eye Exam: fundus check completed in 05/2021, referring to opthalmology   -Diabetic Nephropathy Screening: no microalbuminuria in 05/2021, reordered today   -completed covid vaccinations and booster in dec 2021    Return to clinic in 3 month(s).    Elena Mcgee MD  [facility] Internal Medicine, -1

## 2023-06-02 LAB — NONINV COLON CA DNA+OCC BLD SCRN STL QL: NEGATIVE

## 2023-09-21 ENCOUNTER — TELEPHONE (OUTPATIENT)
Dept: ADMINISTRATIVE | Facility: HOSPITAL | Age: 68
End: 2023-09-21

## 2023-09-21 NOTE — TELEPHONE ENCOUNTER
TELEPHONE VOICE MESSAGE:      GOOD MORNING MS. CASTLE,     MS. POUSSON CALLED ASKING TO SPEAK WITH THE  NURSE.  SHE WANTS TO KNOW WHY SHE IS ON CERTAIN MEDICATIONS, AND WHAT TYPE OF DM SHE HAS.

## 2023-09-21 NOTE — TELEPHONE ENCOUNTER
Called patient ID and  verified, patient is needing records for her insurance, I explained to the patient that she should contact medical records, and she verbalized an understanding.

## 2023-10-13 DIAGNOSIS — I10 HYPERTENSION, UNSPECIFIED TYPE: ICD-10-CM

## 2023-10-13 RX ORDER — LISINOPRIL 30 MG/1
30 TABLET ORAL DAILY
Qty: 60 TABLET | Refills: 3 | Status: SHIPPED | OUTPATIENT
Start: 2023-10-13

## 2023-10-24 ENCOUNTER — OFFICE VISIT (OUTPATIENT)
Dept: INTERNAL MEDICINE | Facility: CLINIC | Age: 68
End: 2023-10-24
Payer: COMMERCIAL

## 2023-10-24 ENCOUNTER — HOSPITAL ENCOUNTER (OUTPATIENT)
Dept: RADIOLOGY | Facility: HOSPITAL | Age: 68
Discharge: HOME | End: 2023-10-24
Payer: COMMERCIAL

## 2023-10-24 VITALS
TEMPERATURE: 98 F | DIASTOLIC BLOOD PRESSURE: 82 MMHG | SYSTOLIC BLOOD PRESSURE: 137 MMHG | HEART RATE: 71 BPM | OXYGEN SATURATION: 98 % | BODY MASS INDEX: 39.96 KG/M2 | RESPIRATION RATE: 18 BRPM | HEIGHT: 59 IN | WEIGHT: 198.19 LBS

## 2023-10-24 DIAGNOSIS — E11.9 TYPE 2 DIABETES MELLITUS WITHOUT COMPLICATION, WITHOUT LONG-TERM CURRENT USE OF INSULIN: Primary | ICD-10-CM

## 2023-10-24 DIAGNOSIS — M54.50 ACUTE BILATERAL LOW BACK PAIN WITHOUT SCIATICA: ICD-10-CM

## 2023-10-24 DIAGNOSIS — Z13.9 SCREENING DUE: ICD-10-CM

## 2023-10-24 LAB
APPEARANCE UR: CLEAR
BACTERIA #/AREA URNS AUTO: ABNORMAL /HPF
BILIRUB UR QL STRIP.AUTO: NEGATIVE
COLOR UR AUTO: ABNORMAL
GLUCOSE UR QL STRIP.AUTO: NORMAL
HYALINE CASTS #/AREA URNS LPF: ABNORMAL /LPF
KETONES UR QL STRIP.AUTO: NEGATIVE
LEUKOCYTE ESTERASE UR QL STRIP.AUTO: 250
MUCOUS THREADS URNS QL MICRO: ABNORMAL /LPF
NITRITE UR QL STRIP.AUTO: NEGATIVE
PH UR STRIP.AUTO: 5 [PH]
PROT UR QL STRIP.AUTO: NEGATIVE
RBC #/AREA URNS AUTO: ABNORMAL /HPF
RBC UR QL AUTO: NEGATIVE
SP GR UR STRIP.AUTO: 1.01 (ref 1–1.03)
SQUAMOUS #/AREA URNS LPF: ABNORMAL /HPF
UROBILINOGEN UR STRIP-ACNC: NORMAL
WBC #/AREA URNS AUTO: ABNORMAL /HPF

## 2023-10-24 PROCEDURE — 72100 X-RAY EXAM L-S SPINE 2/3 VWS: CPT | Mod: TC

## 2023-10-24 PROCEDURE — 96372 THER/PROPH/DIAG INJ SC/IM: CPT | Mod: PBBFAC,59

## 2023-10-24 PROCEDURE — G0008 ADMIN INFLUENZA VIRUS VAC: HCPCS | Mod: PBBFAC

## 2023-10-24 PROCEDURE — 81001 URINALYSIS AUTO W/SCOPE: CPT

## 2023-10-24 PROCEDURE — 99215 OFFICE O/P EST HI 40 MIN: CPT | Mod: PBBFAC

## 2023-10-24 RX ORDER — SEMAGLUTIDE 0.68 MG/ML
0.25 INJECTION, SOLUTION SUBCUTANEOUS
Qty: 3 ML | Refills: 0 | Status: SHIPPED | OUTPATIENT
Start: 2023-10-24 | End: 2023-10-24 | Stop reason: SDUPTHER

## 2023-10-24 RX ORDER — NITROFURANTOIN 25; 75 MG/1; MG/1
100 CAPSULE ORAL 2 TIMES DAILY
COMMUNITY
Start: 2023-10-03 | End: 2024-01-10

## 2023-10-24 RX ORDER — SEMAGLUTIDE 0.68 MG/ML
0.25 INJECTION, SOLUTION SUBCUTANEOUS
Qty: 3 ML | Refills: 0 | Status: SHIPPED | OUTPATIENT
Start: 2023-10-24 | End: 2023-11-20 | Stop reason: SDUPTHER

## 2023-10-24 RX ORDER — KETOROLAC TROMETHAMINE 30 MG/ML
30 INJECTION, SOLUTION INTRAMUSCULAR; INTRAVENOUS ONCE
Status: COMPLETED | OUTPATIENT
Start: 2023-10-24 | End: 2023-10-24

## 2023-10-24 RX ADMIN — KETOROLAC TROMETHAMINE 30 MG: 30 INJECTION, SOLUTION INTRAMUSCULAR; INTRAVENOUS at 09:10

## 2023-10-24 NOTE — PROGRESS NOTES
[facility] INTERNAL MEDICINE  OUTPATIENT OFFICE VISIT NOTE    SUBJECTIVE:      Chief Complaint: Follow-up (Patient states she has low back pain 10/10.)       HPI: Ursula Sampson is a 67 y.o. yo female w/ PMH of T2DM, HLD, HTN, CKD Stage II who presents for follow-up. She had a fall approx 2 months ago with no injury or trauma but did have a large scratch on her back.  She denies any other falls or issues with balance.  She also endorses recent eye swelling with associated scratch to her cornea which has since healed.  She follows with outside opthalmologist.  Previously having difficulty of urinary incontinence which has since resolved with medication management. She is endorsing some low back pain today which she states started approx 1 month ago.  She was seen at an urgent care at that time and told she had a UTI.  Given antibiotics for UTI but lower back pain has persisted.  She states this is an ongoing issue and she has occasional episodes of lower back pain that usually resolve with steroid injections. She states the pain is relieved with sitting and worsened with activity.  Denies any radiation of pain.  Has never had any imagining performed of her spine in the past.  Has no other complaints today and states that overall she has been feeling well.  Continuing to make lifestyle modifications and states she has been on a low carb diet for the last few months.      Past Medical History:  HTN, HLD, T2DM, CKD Stage 2    Past Surgical History:  Hx of cholecystectomy  Hx of D&C in    Hx of      Right shoulder replacement in 2020     Family History:  Mother had hx colon cancer, diabetes, sick sinus syndrome, and dementia     Social History:   reports that she has never smoked. She has never used smokeless tobacco. She reports current alcohol use of about 2.0 standard drinks of alcohol per week. She reports that she does not use drugs.     Allergies:  is allergic to erythromycin and  sulfamethoxazole-trimethoprim.     Home Medications:  Prior to Admission medications    Medication Sig Start Date End Date Taking? Authorizing Provider   albuterol (PROVENTIL) 2.5 mg /3 mL (0.083 %) nebulizer solution SMARTSIG:3 Milliliter(s) Via Nebulizer Every 4-6 Hours PRN 12/29/22  Yes Historical Provider   aspirin 81 MG Chew Take 81 mg by mouth Daily. 2/15/22  Yes Historical Provider   cetirizine (ZYRTEC) 10 MG tablet Take 10 tablets by mouth Daily. 12/28/21  Yes Historical Provider   glyBURIDE (DIABETA) 1.25 MG Tab Take 1 tablet (1.25 mg total) by mouth once daily. 10/27/22  Yes Elena Mcgee MD   Lactobacillus acidophilus 500 million cell Cap Take 1 capsule by mouth 2 (two) times a day. 5/12/21  Yes Historical Provider   mecobalamin, vitamin B12, (B12 ACTIVE) 1,000 mcg Chew Take 1,000 mcg by mouth Daily. 2/15/22  Yes Historical Provider   atorvastatin (LIPITOR) 40 MG tablet Take 40 mg by mouth every evening. 12/2/22   Historical Provider   lisinopriL (PRINIVIL,ZESTRIL) 30 MG tablet Take 1 tablet (30 mg total) by mouth once daily.  Patient not taking: Reported on 1/17/2023 8/23/22   Elena Mcgee MD   metFORMIN (GLUCOPHAGE) 1000 MG tablet Take 1 tablet (1,000 mg total) by mouth 2 (two) times daily.  Patient not taking: Reported on 1/17/2023 6/30/22   Danny Mcdonald DO   paroxetine (PAXIL) 20 MG tablet Take 1 tablet (20 mg total) by mouth once daily.  Patient not taking: Reported on 1/17/2023 8/23/22   Elena Mcgee MD     Review of Systems   Constitutional:  Negative for weight loss.   Eyes:  Negative for blurred vision.   Respiratory:  Negative for cough and shortness of breath.    Cardiovascular:  Negative for chest pain and leg swelling.   Gastrointestinal:  Negative for abdominal pain, constipation, diarrhea, nausea and vomiting.   Genitourinary:  Negative for dysuria, frequency, hematuria and urgency.   Musculoskeletal:  Positive for back pain.   Neurological:  Negative for dizziness, tremors,  "seizures, weakness and headaches.   Endo/Heme/Allergies:  Negative for polydipsia.   Psychiatric/Behavioral:  The patient is not nervous/anxious.          OBJECTIVE:     Vital signs:   /82 (BP Location: Left arm, Patient Position: Sitting, BP Method: Medium (Automatic))   Pulse 71   Temp 97.9 °F (36.6 °C) (Oral)   Resp 18   Ht 4' 11" (1.499 m)   Wt 89.9 kg (198 lb 3.2 oz)   SpO2 98%   BMI 40.03 kg/m²      Physical Examination:  General: Patient resting comfortably, in no acute distress   Eye: PERRLA, EOMI, clear conjunctiva, eyelids normal  HENT: Head-normocephalic and atraumatic  Neck: full range of motion, no thyromegaly or lymphadenopathy, trachea midline, supple, no palpable thyroid nodules  Respiratory: clear to auscultation bilaterally without wheezes, rales, rhonchi  Cardiovascular: regular rate and rhythm without murmurs.  No gallops or rubs no JVD.  Capillary refill within normal limits.  Gastrointestinal: soft, non-tender, non-distended with normal bowel sounds, without masses to palpation  Genitourinary: no CVA tenderness to palpation  Musculoskeletal: full range of motion of all extremities without limitation or discomfort.  Pain in lumbar back associated with bending over.  Leg raise negative.  No tenderness to palpation over spine.  Mild pain associated with range of motion.   Integumentary: no rashes or skin lesions present  Neurologic: no signs of peripheral neurological deficit, motor/sensory function intact  Psychiatric:  alert and oriented, cognitive function intact, cooperative with exam, good eye contact, judgement and insight intact, mood and affect full range.   Foot exam: no lesions or ulcers present, sensation intact, pulses 2+, toenails appear well maintained, skin is not dry or cracked    Labs:  CMP:   Lab Results   Component Value Date    GLUCOSE 171 (H) 01/17/2023    CALCIUM 9.7 01/17/2023    ALBUMIN 4.0 01/17/2023     01/17/2023    K 4.0 01/17/2023    CO2 26 " 01/17/2023    BUN 11.7 01/17/2023    CREATININE 0.86 01/17/2023    ALKPHOS 114 01/17/2023    ALT 42 01/17/2023    AST 25 01/17/2023    BILITOT 0.8 01/17/2023      CBC:   Lab Results   Component Value Date    WBC 10.2 01/17/2023    HGB 13.4 01/17/2023    HCT 40.0 01/17/2023    MCV 90.1 01/17/2023    RDW 12.2 01/17/2023     FLP:   Lab Results   Component Value Date    CHOL 194 01/17/2023    HDL 51 01/17/2023    .00 01/17/2023    TRIG 170 (H) 01/17/2023    TOTALCHOLEST 4 01/17/2023     DM:   Lab Results   Component Value Date    HGBA1C 8.2 (H) 05/12/2021    .6 05/12/2021    CREATININE 0.86 01/17/2023    CREATRANDUR 122.0 (H) 01/17/2023     Thyroid:   Lab Results   Component Value Date    TSH 1.237 01/17/2023     LFTs:   Lab Results   Component Value Date    LABPROT 7.4 01/17/2023    ALBUMIN 4.0 01/17/2023    AST 25 01/17/2023    ALT 42 01/17/2023    ALKPHOS 114 01/17/2023     Anemia:   Lab Results   Component Value Date    YVEPLUFS61 >2,000 (H) 01/17/2023       ASSESSMENT & PLAN:     Diabetes  - Continue metformin 1000 bid, discontinuing glyburide today and starting Ozempic 0.25mg for continued management.   -instructed patient to call in 1 month to evaluate medication regimen, will consider increasing ozempic dose at that time as patient is able to tolerate.    - last A1c 6.4 at last visit, rechecking today   -follows with outside ophthalmologist    -foot exam UTD     HLD  - continue Atorvastatin 40  - last lipid panel 1/17/2023 shows cholesterol wnl but elevated triglycerides.   - patient attempting lifestyle modifications, will continue to monitor     HTN  CKD II  - continue lisinopril 30mg   - CMP, microalbumin/creatinine ratio wnl on 1/17/2023     Depression - well controlled   -Continue Paxil 20  -has been on this medication since 2012 after her  passed away.  Had conversation with patient today about discontinuing as it is not intended to be long-term therapy.  Patient states she would feel  more comfortable staying on the medication as she is worried about reoccurrence of depression and still struggles with personal issues that she believes may exacerbate her symptoms if she does not continue with medication.     Urgency Incontinence  -continue oxybutynin, patient endorses improvement in symptoms    -continue to monitor     Low back pain   -patient endorses mild pain with activity over the last month with history of similar episodes in the past relieved with steroid injections  -ordering lumbar x-ray today for further workup   -giving Toradol injection today for pain relief   -counseled on conservative management as well as stretches to perform at home      Preventative Care  -Flu Vaccine: received 10/24/2023   -TDap Vaccine: 2019  -Herpes Zoster Vaccine: completed 2 dose shingles series today feb 2021  -Pneumovax: received pneumovax 20 on 1/17/2023   -Recent FIT Test: cologuard negative 5/24/2023   -Recent Colonoscopy: as above  -Lung Cancer Screening: never smoker  -DEXA Scan: negative 2/8/2023  -Recent Pap Smear: refusing   -Recent Mammogram: BIRADS 1 2/9/2023   -Diabetic Foot exam: performed 5/9/2023  -Diabetic Eye Exam: fundus check completed in 05/2021, referring to opthalmology   -Diabetic Nephropathy Screening: no microalbuminuria in 05/2021, reordered today   -completed covid vaccinations and booster in dec 2021  -Hep C/HIV: ordered today     Return to clinic in 3 month(s).    Elena Mcgee MD  [facility] Internal Medicine, HO-1

## 2023-10-24 NOTE — PROGRESS NOTES
I have reviewed and concur with the resident's history, physical, assessment, and plan.  I have discussed with him all issues related to the diagnosis, workup and treatment plan. Care provided as reasonable and necessary.  Jay Jaikishen, MD [facility] Jay Jaikishen, MD [facility]

## 2023-11-20 DIAGNOSIS — E11.9 TYPE 2 DIABETES MELLITUS WITHOUT COMPLICATION, WITHOUT LONG-TERM CURRENT USE OF INSULIN: Primary | ICD-10-CM

## 2023-11-20 RX ORDER — SEMAGLUTIDE 0.68 MG/ML
0.5 INJECTION, SOLUTION SUBCUTANEOUS
Qty: 3 ML | Refills: 0 | Status: SHIPPED | OUTPATIENT
Start: 2023-11-20 | End: 2023-11-22 | Stop reason: SDUPTHER

## 2023-11-22 DIAGNOSIS — E11.9 TYPE 2 DIABETES MELLITUS WITHOUT COMPLICATION, WITHOUT LONG-TERM CURRENT USE OF INSULIN: ICD-10-CM

## 2023-11-22 RX ORDER — SEMAGLUTIDE 0.68 MG/ML
0.5 INJECTION, SOLUTION SUBCUTANEOUS
Qty: 3 ML | Refills: 0 | Status: SHIPPED | OUTPATIENT
Start: 2023-11-22 | End: 2024-03-14 | Stop reason: HOSPADM

## 2024-01-10 ENCOUNTER — HOSPITAL ENCOUNTER (EMERGENCY)
Facility: HOSPITAL | Age: 69
Discharge: HOME | End: 2024-01-10
Attending: STUDENT IN AN ORGANIZED HEALTH CARE EDUCATION/TRAINING PROGRAM
Payer: COMMERCIAL

## 2024-01-10 VITALS
DIASTOLIC BLOOD PRESSURE: 67 MMHG | SYSTOLIC BLOOD PRESSURE: 150 MMHG | TEMPERATURE: 98 F | BODY MASS INDEX: 39.99 KG/M2 | WEIGHT: 198 LBS | OXYGEN SATURATION: 99 % | RESPIRATION RATE: 20 BRPM | HEART RATE: 80 BPM

## 2024-01-10 DIAGNOSIS — R14.1 FLATULENCE, ERUCTATION AND GAS PAIN: Primary | ICD-10-CM

## 2024-01-10 DIAGNOSIS — R14.3 FLATULENCE, ERUCTATION AND GAS PAIN: Primary | ICD-10-CM

## 2024-01-10 DIAGNOSIS — K59.00 CONSTIPATION, UNSPECIFIED CONSTIPATION TYPE: ICD-10-CM

## 2024-01-10 DIAGNOSIS — R14.2 FLATULENCE, ERUCTATION AND GAS PAIN: Primary | ICD-10-CM

## 2024-01-10 DIAGNOSIS — N30.00 ACUTE CYSTITIS WITHOUT HEMATURIA: ICD-10-CM

## 2024-01-10 LAB
ALBUMIN SERPL-MCNC: 3.8 G/DL (ref 3.4–4.8)
ALBUMIN/GLOB SERPL: 1.1 RATIO (ref 1.1–2)
ALP SERPL-CCNC: 87 UNIT/L (ref 40–150)
ALT SERPL-CCNC: 18 UNIT/L (ref 0–55)
APPEARANCE UR: ABNORMAL
AST SERPL-CCNC: 16 UNIT/L (ref 5–34)
BACTERIA #/AREA URNS AUTO: ABNORMAL /HPF
BASOPHILS # BLD AUTO: 0.02 X10(3)/MCL
BASOPHILS NFR BLD AUTO: 0.2 %
BILIRUB SERPL-MCNC: 0.4 MG/DL
BILIRUB UR QL STRIP.AUTO: NEGATIVE
BUN SERPL-MCNC: 16.8 MG/DL (ref 9.8–20.1)
CALCIUM SERPL-MCNC: 9.3 MG/DL (ref 8.4–10.2)
CHLORIDE SERPL-SCNC: 107 MMOL/L (ref 98–107)
CO2 SERPL-SCNC: 26 MMOL/L (ref 23–31)
COLOR UR AUTO: YELLOW
CREAT SERPL-MCNC: 0.79 MG/DL (ref 0.55–1.02)
EOSINOPHIL # BLD AUTO: 0.19 X10(3)/MCL (ref 0–0.9)
EOSINOPHIL NFR BLD AUTO: 1.8 %
ERYTHROCYTE [DISTWIDTH] IN BLOOD BY AUTOMATED COUNT: 11.9 % (ref 11.5–17)
GFR SERPLBLD CREATININE-BSD FMLA CKD-EPI: >60 MLS/MIN/1.73/M2
GLOBULIN SER-MCNC: 3.4 GM/DL (ref 2.4–3.5)
GLUCOSE SERPL-MCNC: 150 MG/DL (ref 82–115)
GLUCOSE UR QL STRIP.AUTO: ABNORMAL
HCT VFR BLD AUTO: 42.5 % (ref 37–47)
HGB BLD-MCNC: 14.1 G/DL (ref 12–16)
HOLD SPECIMEN: NORMAL
IMM GRANULOCYTES # BLD AUTO: 0.03 X10(3)/MCL (ref 0–0.04)
IMM GRANULOCYTES NFR BLD AUTO: 0.3 %
KETONES UR QL STRIP.AUTO: NEGATIVE
LEUKOCYTE ESTERASE UR QL STRIP.AUTO: 500
LIPASE SERPL-CCNC: 17 U/L
LYMPHOCYTES # BLD AUTO: 3.06 X10(3)/MCL (ref 0.6–4.6)
LYMPHOCYTES NFR BLD AUTO: 29.6 %
MCH RBC QN AUTO: 30.6 PG (ref 27–31)
MCHC RBC AUTO-ENTMCNC: 33.2 G/DL (ref 33–36)
MCV RBC AUTO: 92.2 FL (ref 80–94)
MONOCYTES # BLD AUTO: 0.79 X10(3)/MCL (ref 0.1–1.3)
MONOCYTES NFR BLD AUTO: 7.6 %
MUCOUS THREADS URNS QL MICRO: ABNORMAL /LPF
NEUTROPHILS # BLD AUTO: 6.24 X10(3)/MCL (ref 2.1–9.2)
NEUTROPHILS NFR BLD AUTO: 60.5 %
NITRITE UR QL STRIP.AUTO: NEGATIVE
NON-SQ EPI CELLS URNS QL MICRO: ABNORMAL /HPF
NRBC BLD AUTO-RTO: 0 %
PH UR STRIP.AUTO: 5.5 [PH]
PLATELET # BLD AUTO: 397 X10(3)/MCL (ref 130–400)
PMV BLD AUTO: 9.4 FL (ref 7.4–10.4)
POTASSIUM SERPL-SCNC: 4.9 MMOL/L (ref 3.5–5.1)
PROT SERPL-MCNC: 7.2 GM/DL (ref 5.8–7.6)
PROT UR QL STRIP.AUTO: ABNORMAL
RBC # BLD AUTO: 4.61 X10(6)/MCL (ref 4.2–5.4)
RBC #/AREA URNS AUTO: ABNORMAL /HPF
RBC UR QL AUTO: NEGATIVE
SODIUM SERPL-SCNC: 140 MMOL/L (ref 136–145)
SP GR UR STRIP.AUTO: 1.03 (ref 1–1.03)
SQUAMOUS #/AREA URNS LPF: ABNORMAL /HPF
UROBILINOGEN UR STRIP-ACNC: NORMAL
WBC # SPEC AUTO: 10.33 X10(3)/MCL (ref 4.5–11.5)
WBC #/AREA URNS AUTO: ABNORMAL /HPF

## 2024-01-10 PROCEDURE — 85025 COMPLETE CBC W/AUTO DIFF WBC: CPT | Performed by: NURSE PRACTITIONER

## 2024-01-10 PROCEDURE — 87086 URINE CULTURE/COLONY COUNT: CPT | Performed by: NURSE PRACTITIONER

## 2024-01-10 PROCEDURE — 81001 URINALYSIS AUTO W/SCOPE: CPT | Performed by: NURSE PRACTITIONER

## 2024-01-10 PROCEDURE — 99284 EMERGENCY DEPT VISIT MOD MDM: CPT

## 2024-01-10 PROCEDURE — 83690 ASSAY OF LIPASE: CPT | Performed by: NURSE PRACTITIONER

## 2024-01-10 PROCEDURE — 80053 COMPREHEN METABOLIC PANEL: CPT | Performed by: NURSE PRACTITIONER

## 2024-01-10 RX ORDER — LACTULOSE 10 G/15ML
20 SOLUTION ORAL DAILY PRN
Qty: 90 ML | Refills: 0 | Status: SHIPPED | OUTPATIENT
Start: 2024-01-10 | End: 2024-01-13

## 2024-01-10 RX ORDER — NITROFURANTOIN 25; 75 MG/1; MG/1
100 CAPSULE ORAL 2 TIMES DAILY
Qty: 14 CAPSULE | Refills: 0 | Status: SHIPPED | OUTPATIENT
Start: 2024-01-10 | End: 2024-01-17

## 2024-01-10 NOTE — ED PROVIDER NOTES
"Encounter Date: 1/10/2024       History     Chief Complaint   Patient presents with    unable to belch     Pt states she wants to "belch" and can't "belch". Denies pain. Seen MD in [place], started on gerd meds. Stopped taking Ozempic 1 week ago     The patient presents with an intermittent sensation that she needs to belch. She also reports increased flatulence. This started about 2 months ago after starting Ozempic. She had her last weekly dose a week ago. She denies abdominal pain, nausea, vomiting, diarrhea, dysuria, fever, and chills.      Review of patient's allergies indicates:   Allergen Reactions    Erythromycin      Other reaction(s): THROAT PAIN    Sulfamethoxazole-trimethoprim Other (See Comments)     Father. Pt. does not want     History reviewed. No pertinent past medical history.  History reviewed. No pertinent surgical history.  Family History   Problem Relation Age of Onset    Colon cancer Mother     Diabetes Mother     Stroke Father      Social History     Tobacco Use    Smoking status: Never    Smokeless tobacco: Never   Substance Use Topics    Alcohol use: Yes     Alcohol/week: 2.0 standard drinks of alcohol     Types: 2 Cans of beer per week    Drug use: Never     Review of Systems   Constitutional:  Negative for fever.   HENT:  Negative for sore throat.    Respiratory:  Negative for shortness of breath.    Cardiovascular:  Negative for chest pain.   Gastrointestinal:  Negative for nausea.   Genitourinary:  Negative for dysuria.   Musculoskeletal:  Negative for back pain.   Skin:  Negative for rash.   Neurological:  Negative for weakness.   Hematological:  Does not bruise/bleed easily.   All other systems reviewed and are negative.      Physical Exam     Initial Vitals [01/10/24 1110]   BP Pulse Resp Temp SpO2   (!) 154/78 86 18 98.2 °F (36.8 °C) 100 %      MAP       --         Physical Exam    Nursing note and vitals reviewed.  Constitutional: She appears well-developed and well-nourished.   HENT: "   Head: Normocephalic and atraumatic.   Neck: Neck supple.   Normal range of motion.  Cardiovascular:  Normal rate, regular rhythm, normal heart sounds and intact distal pulses.           Pulmonary/Chest: Effort normal and breath sounds normal.   Abdominal: Abdomen is soft and flat. Bowel sounds are normal. There is no abdominal tenderness.   Musculoskeletal:         General: Normal range of motion.      Cervical back: Normal range of motion and neck supple.     Neurological: She is alert. She has normal strength.   Skin: Skin is warm and dry.   Psychiatric: She has a normal mood and affect.         ED Course   Procedures  Labs Reviewed   COMPREHENSIVE METABOLIC PANEL - Abnormal; Notable for the following components:       Result Value    Glucose Level 150 (*)     All other components within normal limits   URINALYSIS, REFLEX TO URINE CULTURE - Abnormal; Notable for the following components:    Appearance, UA Turbid (*)     Specific Gravity, UA 1.031 (*)     Protein, UA 1+ (*)     Glucose, UA 1+ (*)     Leukocyte Esterase,  (*)     WBC, UA 21-50 (*)     Mucous, UA Small (*)     All other components within normal limits   LIPASE - Normal   CULTURE, URINE   CBC W/ AUTO DIFFERENTIAL    Narrative:     The following orders were created for panel order CBC auto differential.  Procedure                               Abnormality         Status                     ---------                               -----------         ------                     CBC with Differential[[id number]]                           Final result                 Please view results for these tests on the individual orders.   CBC WITH DIFFERENTIAL   EXTRA TUBES    Narrative:     The following orders were created for panel order EXTRA TUBES.  Procedure                               Abnormality         Status                     ---------                               -----------         ------                     Light Blue Top Hold[[id number]]                              In process                 Gold Top Hold[[id number]]                                   In process                 Pink Top Hold[[id number]]                                   In process                   Please view results for these tests on the individual orders.   LIGHT BLUE TOP HOLD   GOLD TOP HOLD   PINK TOP HOLD          Imaging Results              XR ABDOMEN  ACUTE 2 OR MORE VIEWS WITH CHEST (Final result)  Result time 01/10/24 12:08:13      Final result by Anitra Brink MD (01/10/24 12:08:13)                   Impression:      Large colonic stool burden.      Electronically signed by: Anitra Brink  Date:    01/10/2024  Time:    12:08               Narrative:    EXAMINATION:  XR ABDOMEN ACUTE 2 OR MORE VIEWS WITH CHEST    CLINICAL HISTORY:  abdominal discomfort;    TECHNIQUE:  Supine and upright views of the abdomen performed.  Frontal view of the chest obtained.    COMPARISON:  None.    FINDINGS:  Cardiomediastinal silhouette is normal.  The lungs are clear.    No dilated bowel loops. No evidence of obstruction.Large stool burden.    No evidence of free intraperitoneal air.    There are postsurgical changes of right shoulder reverse arthroplasty.  There are phleboliths in the pelvis.                                       Medications - No data to display  Medical Decision Making  The patient presents with an intermittent sensation that she needs to belch. She also reports increased flatulence. This started about 2 months ago after starting Ozempic. She had her last weekly dose a week ago. She denies abdominal pain, nausea, vomiting, diarrhea, dysuria, fever, and chills.    She has decided to stop Ozempic to see if her symptoms resolve. She will f/u with her pcp. Strict return to ER precautions given.    Amount and/or Complexity of Data Reviewed  Labs: ordered.  Radiology: ordered.      Additional MDM:   Differential Diagnosis:   At this time differential diagnosis is but  not limited to ozempic side effect, flatulence, GERD              ED Course as of 01/10/24 1356   Wed Osmani 10, 2024   1346 XR ABDOMEN  ACUTE 2 OR MORE VIEWS WITH CHEST  Impression:     Large colonic stool burden.      [RB]   1346 Urinalysis, Reflex to Urine Culture(!) [RB]   1347 Appearance, UA(!): Turbid [RB]   1347 Specific Gravity,UA(!): 1.031 [RB]   1347 Protein, UA(!): 1+ [RB]   1347 Glucose, UA(!): 1+ [RB]   1347 Leukocytes, UA(!): 500 [RB]   1347 WBC, UA(!): 21-50 [RB]      ED Course User Index  [RB] Diallo Zarco ACNP                           Clinical Impression:  Final diagnoses:  [R14.3, R14.1, R14.2] Flatulence, eructation and gas pain (Primary)  [N30.00] Acute cystitis without hematuria  [K59.00] Constipation, unspecified constipation type          ED Disposition Condition    Discharge Stable          ED Prescriptions       Medication Sig Dispense Start Date End Date Auth. Provider    nitrofurantoin, macrocrystal-monohydrate, (MACROBID) 100 MG capsule Take 1 capsule (100 mg total) by mouth 2 (two) times daily. for 7 days 14 capsule 1/10/2024 1/17/2024 Diallo Zarco ACNP    lactulose (CHRONULAC) 20 gram/30 mL Soln Take 30 mLs (20 g total) by mouth daily as needed (constipation). 90 mL 1/10/2024 1/13/2024 Diallo Zarco ACNP          Follow-up Information       Follow up With Specialties Details Why Contact Info    Elena Mcgee MD Internal Medicine In 3 days  [address] [facility] [address]  [phone]      [facility] Emergency Medicine  If symptoms worsen [address] [facility] [id number] [phone]             Diallo Zarco ACNP  01/10/24 9685

## 2024-01-12 LAB
BACTERIA UR CULT: ABNORMAL

## 2024-03-14 ENCOUNTER — OFFICE VISIT (OUTPATIENT)
Dept: INTERNAL MEDICINE | Facility: CLINIC | Age: 69
End: 2024-03-14
Payer: COMMERCIAL

## 2024-03-14 VITALS
DIASTOLIC BLOOD PRESSURE: 64 MMHG | TEMPERATURE: 98 F | HEART RATE: 58 BPM | BODY MASS INDEX: 38.1 KG/M2 | HEIGHT: 59 IN | SYSTOLIC BLOOD PRESSURE: 122 MMHG | WEIGHT: 189 LBS | RESPIRATION RATE: 18 BRPM | OXYGEN SATURATION: 98 %

## 2024-03-14 DIAGNOSIS — E78.5 HYPERLIPIDEMIA, UNSPECIFIED HYPERLIPIDEMIA TYPE: ICD-10-CM

## 2024-03-14 DIAGNOSIS — E11.9 TYPE 2 DIABETES MELLITUS WITHOUT COMPLICATION, WITH LONG-TERM CURRENT USE OF INSULIN: ICD-10-CM

## 2024-03-14 DIAGNOSIS — Z79.4 TYPE 2 DIABETES MELLITUS WITHOUT COMPLICATION, WITH LONG-TERM CURRENT USE OF INSULIN: ICD-10-CM

## 2024-03-14 DIAGNOSIS — N39.41 URGE INCONTINENCE OF URINE: ICD-10-CM

## 2024-03-14 DIAGNOSIS — E11.9 TYPE 2 DIABETES MELLITUS WITHOUT COMPLICATION, WITHOUT LONG-TERM CURRENT USE OF INSULIN: Primary | ICD-10-CM

## 2024-03-14 DIAGNOSIS — Z12.39 ENCOUNTER FOR SCREENING FOR MALIGNANT NEOPLASM OF BREAST, UNSPECIFIED SCREENING MODALITY: ICD-10-CM

## 2024-03-14 DIAGNOSIS — M54.16 LUMBAR RADICULOPATHY, CHRONIC: ICD-10-CM

## 2024-03-14 DIAGNOSIS — I10 HYPERTENSION, UNSPECIFIED TYPE: ICD-10-CM

## 2024-03-14 DIAGNOSIS — N39.0 RECURRENT UTI: ICD-10-CM

## 2024-03-14 LAB — HBA1C MFR BLD: 6.7 %

## 2024-03-14 PROCEDURE — 99215 OFFICE O/P EST HI 40 MIN: CPT | Mod: PBBFAC

## 2024-03-14 PROCEDURE — 83036 HEMOGLOBIN GLYCOSYLATED A1C: CPT | Mod: PBBFAC

## 2024-03-14 NOTE — PROGRESS NOTES
[facility] INTERNAL MEDICINE  OUTPATIENT OFFICE VISIT NOTE    SUBJECTIVE:      Chief Complaint: Follow-up (Patient states no acute complaints today.)     HPI: Ursula Sampson is a 68 y.o. yo female w/ PMH of T2DM, HLD, HTN, CKD Stage II who presents for follow-up. Patient is reporting increased episodes of UTI's over the last few months and has been seen in an urgent care for these episodes where she reports she has no growth in her urine cultures and was never given antibiotics.  She is also reporting continued lower back pain which is aggravated by movement and does not improve with OTC therapy.  She states the pain is persistent and has been present over the last 6 months although was episodic in nature prior to this.  Otherwise patient has no new acute complaints and reports good compliance with medication regimen.  She does states she stopped taking Ozempic approx 3 months ago as she was not able to tolerate GI side effects.      Past Medical History:  HTN, HLD, T2DM, CKD Stage 2    Past Surgical History:  Hx of cholecystectomy  Hx of D&C in    Hx of      Right shoulder replacement in 2020     Family History:  Mother had hx colon cancer, diabetes, sick sinus syndrome, and dementia     Social History:   reports that she has never smoked. She has never used smokeless tobacco. She reports current alcohol use of about 2.0 standard drinks of alcohol per week. She reports that she does not use drugs.     Allergies:  is allergic to erythromycin and sulfamethoxazole-trimethoprim.     Home Medications:  Prior to Admission medications    Medication Sig Start Date End Date Taking? Authorizing Provider   albuterol (PROVENTIL) 2.5 mg /3 mL (0.083 %) nebulizer solution SMARTSIG:3 Milliliter(s) Via Nebulizer Every 4-6 Hours PRN 22  Yes Historical Provider   aspirin 81 MG Chew Take 81 mg by mouth Daily. 2/15/22  Yes Historical Provider   cetirizine (ZYRTEC) 10 MG tablet Take 10 tablets by mouth Daily.  "12/28/21  Yes Historical Provider   glyBURIDE (DIABETA) 1.25 MG Tab Take 1 tablet (1.25 mg total) by mouth once daily. 10/27/22  Yes Elena Mcgee MD   Lactobacillus acidophilus 500 million cell Cap Take 1 capsule by mouth 2 (two) times a day. 5/12/21  Yes Historical Provider   mecobalamin, vitamin B12, (B12 ACTIVE) 1,000 mcg Chew Take 1,000 mcg by mouth Daily. 2/15/22  Yes Historical Provider   atorvastatin (LIPITOR) 40 MG tablet Take 40 mg by mouth every evening. 12/2/22   Historical Provider   lisinopriL (PRINIVIL,ZESTRIL) 30 MG tablet Take 1 tablet (30 mg total) by mouth once daily.  Patient not taking: Reported on 1/17/2023 8/23/22   Elena Mcgee MD   metFORMIN (GLUCOPHAGE) 1000 MG tablet Take 1 tablet (1,000 mg total) by mouth 2 (two) times daily.  Patient not taking: Reported on 1/17/2023 6/30/22   Danny Mcdonald DO   paroxetine (PAXIL) 20 MG tablet Take 1 tablet (20 mg total) by mouth once daily.  Patient not taking: Reported on 1/17/2023 8/23/22   Elena Mcgee MD     Review of Systems   Constitutional:  Negative for weight loss.   Eyes:  Negative for blurred vision.   Respiratory:  Negative for cough and shortness of breath.    Cardiovascular:  Negative for chest pain and leg swelling.   Gastrointestinal:  Negative for abdominal pain, constipation, diarrhea, nausea and vomiting.   Genitourinary:  Negative for dysuria, frequency, hematuria and urgency.   Musculoskeletal:  Positive for back pain.   Neurological:  Negative for dizziness, tremors, seizures, weakness and headaches.   Endo/Heme/Allergies:  Negative for polydipsia.   Psychiatric/Behavioral:  The patient is not nervous/anxious.          OBJECTIVE:     Vital signs:   /64 (BP Location: Left arm, Patient Position: Sitting, BP Method: Medium (Manual))   Pulse (!) 58   Temp 97.9 °F (36.6 °C) (Oral)   Resp 18   Ht 4' 11" (1.499 m)   Wt 85.7 kg (189 lb)   SpO2 98%   BMI 38.17 kg/m²      Physical Examination:  General: Patient " resting comfortably, in no acute distress   Eye: PERRLA, EOMI, clear conjunctiva, eyelids normal  HENT: Head-normocephalic and atraumatic  Neck: full range of motion, no thyromegaly or lymphadenopathy, trachea midline, supple, no palpable thyroid nodules  Respiratory: clear to auscultation bilaterally without wheezes, rales, rhonchi  Cardiovascular: regular rate and rhythm without murmurs.  No gallops or rubs no JVD.  Capillary refill within normal limits.  Gastrointestinal: soft, non-tender, non-distended with normal bowel sounds, without masses to palpation  Genitourinary: no CVA tenderness to palpation  Musculoskeletal: full range of motion of all extremities without limitation or discomfort.  Pain in lumbar back associated with bending over.  Leg raise negative.  No tenderness to palpation over spine.  Mild pain associated with range of motion.   Integumentary: no rashes or skin lesions present  Neurologic: no signs of peripheral neurological deficit, motor/sensory function intact  Psychiatric:  alert and oriented, cognitive function intact, cooperative with exam, good eye contact, judgement and insight intact, mood and affect full range.   Foot exam: no lesions or ulcers present, sensation intact, pulses 2+, toenails appear well maintained, skin is not dry or cracked    Labs:  CMP:   Lab Results   Component Value Date    GLUCOSE 150 (H) 01/10/2024    CALCIUM 9.3 01/10/2024    ALBUMIN 3.8 01/10/2024     01/10/2024    K 4.9 01/10/2024    CO2 26 01/10/2024    BUN 16.8 01/10/2024    CREATININE 0.79 01/10/2024    ALKPHOS 87 01/10/2024    ALT 18 01/10/2024    AST 16 01/10/2024    BILITOT 0.4 01/10/2024      CBC:   Lab Results   Component Value Date    WBC 10.33 01/10/2024    HGB 14.1 01/10/2024    HCT 42.5 01/10/2024    MCV 92.2 01/10/2024    RDW 11.9 01/10/2024     FLP:   Lab Results   Component Value Date    CHOL 194 01/17/2023    HDL 51 01/17/2023    .00 01/17/2023    TRIG 170 (H) 01/17/2023     TOTALCHOLEST 4 01/17/2023     DM:   Lab Results   Component Value Date    HGBA1C 6.1 10/24/2023    .4 10/24/2023    CREATININE 0.79 01/10/2024    CREATRANDUR 122.0 (H) 01/17/2023     Thyroid:   Lab Results   Component Value Date    TSH 1.237 01/17/2023     LFTs:   Lab Results   Component Value Date    LABPROT 7.2 01/10/2024    ALBUMIN 3.8 01/10/2024    AST 16 01/10/2024    ALT 18 01/10/2024    ALKPHOS 87 01/10/2024     Anemia:   Lab Results   Component Value Date    RWKKHVTP07 >2,000 (H) 01/17/2023       ASSESSMENT & PLAN:     Diabetes  - Continue metformin 1000 bid and glyburide 1.25mg daily   -previously attempted Ozempic but patient was unable to tolerate secondary to GI upset and also states the medication was too expensive.   -A1c 6.1 at last visit, 6.7 today   -follows with outside ophthalmologist    -foot exam UTD     HLD  - continue Atorvastatin 40  - last lipid panel 1/17/2023 shows cholesterol wnl but elevated triglycerides.   - patient attempting lifestyle modifications, will continue to monitor     HTN  CKD II  - continue lisinopril 30mg   - CMP, microalbumin/creatinine ratio wnl on 1/17/2023     Depression - well controlled   -Continue Paxil 20  -has been on this medication since 2012 after her  passed away.  Had conversation with patient today about discontinuing as it is not intended to be long-term therapy.  Patient states she would feel more comfortable staying on the medication as she is worried about reoccurrence of depression and still struggles with personal issues that she believes may exacerbate her symptoms if she does not continue with medication.     Urgency Incontinence  Recurrent UTI's  -continue oxybutynin, patient endorses improvement in symptoms    -reports three episodes of UTI's diagnosed in an urgent care in [place] over the last three months.  States this is has been a problem for several years.  She states her urine cultures at these outside facilities returned with no  growth and she never received any antibiotics.  She also reports that she suspects she may not be fully emptying her bladder as she remains with the feeling like she has to pee   -referring to urology today for further workup and recommendations     Low back pain   -patient continuing to endorse lower back pain over beginning approx 6 months ago that is unrelieved with OTC medications and is made worse with activity.  She also reports occasional numbness to her left lateral thigh.  Denies any spinal tenderness.    -lumbar x-ray showing narrowing at multiple levels but specially L1-L2 marginal osteophytes also identified at multiple levels as well as degenerative changes.   -counseled on conservative management as well as stretches to perform at home   -ordering MRI today for further workup   -will refer patient to physical therapy for continued treatment and assessment.      Preventative Care  -Flu Vaccine: received 10/24/2023   -TDap Vaccine: 2019  -Herpes Zoster Vaccine: completed 2 dose shingles series today feb 2021  -Pneumovax: received pneumovax 20 on 1/17/2023   -Recent FIT Test: cologuard negative 5/24/2023   -Recent Colonoscopy: as above  -Lung Cancer Screening: never smoker  -DEXA Scan: negative 2/8/2023  -Recent Pap Smear: refusing   -Recent Mammogram: BIRADS 1 2/9/2023, reordered today    -Diabetic Foot exam: performed 5/9/2023  -Diabetic Eye Exam: fundus check completed in 05/2021, following opthalmology   -Diabetic Nephropathy Screening: no microalbuminuria in 5/2021, reordered today for next visit   -completed covid vaccinations and booster in dec 2021  -Hep C/HIV: ordered today     Lab work at next visit   Return to clinic in 3 month(s).    Elena Mcgee MD  [facility]

## 2024-05-23 RX ORDER — GLYBURIDE 1.25 MG/1
1.25 TABLET ORAL DAILY
Qty: 90 TABLET | Refills: 3 | Status: SHIPPED | OUTPATIENT
Start: 2024-05-23

## 2024-05-28 ENCOUNTER — TELEPHONE (OUTPATIENT)
Dept: INTERNAL MEDICINE | Facility: CLINIC | Age: 69
End: 2024-05-28
Payer: COMMERCIAL

## 2024-05-28 NOTE — TELEPHONE ENCOUNTER
----- Message from Silver Herzog sent at 5/28/2024 11:02 AM CDT -----  .Type:  Pharmacy Calling to Clarify an RX    Name of Caller:Carlos   Pharmacy Name:[facility]   Prescription Name:metFORMIN (GLUCOPHAGE) 1000 MG tablet and paroxetine (PAXIL) 20 MG tablet  What do they need to clarify?:needs refills   Best Call Back Number:[phone] and the fax is [phone]  Additional Information:

## 2024-05-28 NOTE — TELEPHONE ENCOUNTER
Spoke with Carlos and informed him to refill both medications with 3 refills, he verbalized an understanding and call ended.

## 2024-06-13 ENCOUNTER — OFFICE VISIT (OUTPATIENT)
Dept: UROLOGY | Facility: CLINIC | Age: 69
End: 2024-06-13
Payer: COMMERCIAL

## 2024-06-13 VITALS
DIASTOLIC BLOOD PRESSURE: 74 MMHG | BODY MASS INDEX: 39.72 KG/M2 | TEMPERATURE: 98 F | SYSTOLIC BLOOD PRESSURE: 122 MMHG | HEIGHT: 59 IN | OXYGEN SATURATION: 98 % | WEIGHT: 197 LBS | RESPIRATION RATE: 20 BRPM | HEART RATE: 69 BPM

## 2024-06-13 DIAGNOSIS — N39.41 URGE INCONTINENCE OF URINE: ICD-10-CM

## 2024-06-13 DIAGNOSIS — N39.0 RECURRENT UTI: ICD-10-CM

## 2024-06-13 LAB
BILIRUB SERPL-MCNC: NORMAL MG/DL
BLOOD URINE, POC: NORMAL
COLOR, POC UA: YELLOW
GLUCOSE UR QL STRIP: NORMAL
KETONES UR QL STRIP: NORMAL
LEUKOCYTE ESTERASE URINE, POC: NORMAL
NITRITE, POC UA: NORMAL
PH, POC UA: 5.5
PROTEIN, POC: NORMAL
SPECIFIC GRAVITY, POC UA: >=1.03
UROBILINOGEN, POC UA: 0.2

## 2024-06-13 PROCEDURE — 4010F ACE/ARB THERAPY RXD/TAKEN: CPT | Mod: CPTII,,, | Performed by: UROLOGY

## 2024-06-13 PROCEDURE — 3078F DIAST BP <80 MM HG: CPT | Mod: CPTII,,, | Performed by: UROLOGY

## 2024-06-13 PROCEDURE — 3074F SYST BP LT 130 MM HG: CPT | Mod: CPTII,,, | Performed by: UROLOGY

## 2024-06-13 PROCEDURE — 1101F PT FALLS ASSESS-DOCD LE1/YR: CPT | Mod: CPTII,,, | Performed by: UROLOGY

## 2024-06-13 PROCEDURE — 81001 URINALYSIS AUTO W/SCOPE: CPT | Mod: PBBFAC | Performed by: UROLOGY

## 2024-06-13 PROCEDURE — 3288F FALL RISK ASSESSMENT DOCD: CPT | Mod: CPTII,,, | Performed by: UROLOGY

## 2024-06-13 PROCEDURE — 99204 OFFICE O/P NEW MOD 45 MIN: CPT | Mod: S$PBB,,, | Performed by: UROLOGY

## 2024-06-13 PROCEDURE — 1125F AMNT PAIN NOTED PAIN PRSNT: CPT | Mod: CPTII,,, | Performed by: UROLOGY

## 2024-06-13 PROCEDURE — 1159F MED LIST DOCD IN RCRD: CPT | Mod: CPTII,,, | Performed by: UROLOGY

## 2024-06-13 PROCEDURE — 3008F BODY MASS INDEX DOCD: CPT | Mod: CPTII,,, | Performed by: UROLOGY

## 2024-06-13 PROCEDURE — 1160F RVW MEDS BY RX/DR IN RCRD: CPT | Mod: CPTII,,, | Performed by: UROLOGY

## 2024-06-13 PROCEDURE — 3044F HG A1C LEVEL LT 7.0%: CPT | Mod: CPTII,,, | Performed by: UROLOGY

## 2024-06-13 PROCEDURE — 99215 OFFICE O/P EST HI 40 MIN: CPT | Mod: PBBFAC | Performed by: UROLOGY

## 2024-06-13 NOTE — PROGRESS NOTES
CC:  Recurrent UTI and urge incontinence    HPI:  Ursula Sampson is a 68 y.o. female seen in consultation for recurrent urinary tract infections.  Starting at the end of the year in 2023 she had three urinary tract infections.  The only symptoms she had was a back ache and odor to the urine.  She was just told that she had a urinary tract infection when they checked her urine.   The only culture I have was from 10 January 2024 and it grew out small amounts of bacteria. She was told that one of the cultures at [facility] was negative.  She has persistent low back ache.  She denies history of kidney stones.  She has urgency and urge incontinence.  She was put on oxybutynin IR 5 mg and that is helping but she still has problems.  She doesn't take it three times a day because it causes severe dry mouth.        Urinalysis:  Results for orders placed or performed in visit on 06/13/24   POCT URINE DIPSTICK WITH MICROSCOPE, AUTOMATED   Result Value Ref Range    Color, UA Yellow     Spec Grav UA >=1.030     pH, UA 5.5     WBC, UA moderate     Nitrite, UA neg     Protein, POC neg     Glucose, UA neg     Ketones, UA trace     Urobilinogen, UA 0.2     Bilirubin, POC neg     Blood, UA neg      Microscopic Urinalysis:  WBC:   1-2 per HPF     RBC:    None per HPF    Bacteria:    None per HPF     Squamous epithelial cells:    1-2 per HPF       Crystals:   None      Data Review:  Note from referring provider, Elena Mcgee MD dated 14 March 2024.  Urine culture.     ROS:  All systems reviewed and are negative except as documented in HPI and/or Assessment and Plan.     Patient Active Problem List:     There is no problem list on file for this patient.       Past Medical History:  History reviewed. No pertinent past medical history.     Past Surgical History:  History reviewed. No pertinent surgical history.     Family History:  Family History   Problem Relation Name Age of Onset    Colon cancer Mother      Diabetes Mother       Stroke Father          Social History:  Social History     Socioeconomic History    Marital status:    Tobacco Use    Smoking status: Never     Passive exposure: Never    Smokeless tobacco: Never   Substance and Sexual Activity    Alcohol use: Yes     Alcohol/week: 2.0 standard drinks of alcohol     Types: 2 Cans of beer per week    Drug use: Never    Sexual activity: Not Currently     Social Determinants of Health     Financial Resource Strain: Low Risk  (1/17/2023)    Overall Financial Resource Strain (CARDIA)     Difficulty of Paying Living Expenses: Not hard at all   Food Insecurity: No Food Insecurity (1/17/2023)    Hunger Vital Sign     Worried About Running Out of Food in the Last Year: Never true     Ran Out of Food in the Last Year: Never true   Transportation Needs: No Transportation Needs (1/17/2023)    PRAPARE - Transportation     Lack of Transportation (Medical): No     Lack of Transportation (Non-Medical): No   Physical Activity: Inactive (1/17/2023)    Exercise Vital Sign     Days of Exercise per Week: 0 days     Minutes of Exercise per Session: 0 min   Stress: No Stress Concern Present (1/17/2023)    [employer] - Occupational Stress Questionnaire     Feeling of Stress : Only a little   Housing Stability: Low Risk  (1/17/2023)    Housing Stability Vital Sign     Unable to Pay for Housing in the Last Year: No     Number of Places Lived in the Last Year: 1     Unstable Housing in the Last Year: No        Allergies:  Review of patient's allergies indicates:   Allergen Reactions    Erythromycin      Other reaction(s): THROAT PAIN    Sulfamethoxazole-trimethoprim Other (See Comments)     Father. Pt. does not want        Objective:  Vitals:    06/13/24 1445   BP: 122/74   Pulse: 69   Resp: 20   Temp: 97.9 °F (36.6 °C)     General:  Well developed, well nourished adult female in no acute distress  Abdomen: Soft, nontender, no masses  Extremities:  No clubbing,  cyanosis, or edema  Neurologic:  Grossly intact  Musculoskeletal:  Normal tone    Assessment:  1. Recurrent UTI  - Ambulatory referral/consult to Urology  - POCT URINE DIPSTICK WITH MICROSCOPE, AUTOMATED  - US Retroperitoneal Limited; Future    2. Urge incontinence of urine  - Ambulatory referral/consult to Urology  - POCT URINE DIPSTICK WITH MICROSCOPE, AUTOMATED     Plan:  We will get a renal ultrasound.  She was placed on Gemtesa.  If her insurance will not cover this she will stay on the oxybutynin.  I cut switch her to the extended release at the next visit and this may cause less dry mouth if necessary.    Follow-up:    She will come for follow-up at the end of July when she has an appointment here at the hospital with another physician to limit her number of visits here.

## 2024-06-13 NOTE — PROGRESS NOTES
Patient seen by Dr. Kirkpatrick. Pt will RTC 1 month with Ultrasound. Pt education given both written and verbal.

## 2024-07-30 ENCOUNTER — TELEPHONE (OUTPATIENT)
Dept: UROLOGY | Facility: CLINIC | Age: 69
End: 2024-07-30
Payer: COMMERCIAL

## 2024-07-30 NOTE — TELEPHONE ENCOUNTER
Thank you!    ----- Message from Brenda Gonzales sent at 7/30/2024  3:07 PM CDT -----  Regarding: RE: Reschedule Urology Appt  Appt r/s to 8/29/24. Left voicemail on Son Delroy's phone. Thanks  ----- Message -----  From: Silvina Link RN  Sent: 7/30/2024  11:01 AM CDT  To: Brenda Gonzales  Subject: Reschedule Urology Appt                          Pt has appt tomorrow but did not get renal US done. Please call pt to reschedule & remind them to get US done beforehand. Thanks!

## 2024-07-31 ENCOUNTER — OFFICE VISIT (OUTPATIENT)
Dept: INTERNAL MEDICINE | Facility: CLINIC | Age: 69
End: 2024-07-31
Payer: COMMERCIAL

## 2024-07-31 ENCOUNTER — LAB VISIT (OUTPATIENT)
Dept: LAB | Facility: HOSPITAL | Age: 69
End: 2024-07-31
Payer: COMMERCIAL

## 2024-07-31 VITALS
HEIGHT: 59 IN | TEMPERATURE: 98 F | BODY MASS INDEX: 39.48 KG/M2 | SYSTOLIC BLOOD PRESSURE: 132 MMHG | RESPIRATION RATE: 18 BRPM | OXYGEN SATURATION: 97 % | DIASTOLIC BLOOD PRESSURE: 71 MMHG | WEIGHT: 195.81 LBS | HEART RATE: 64 BPM

## 2024-07-31 DIAGNOSIS — I10 HYPERTENSION, UNSPECIFIED TYPE: ICD-10-CM

## 2024-07-31 DIAGNOSIS — M54.16 LUMBAR RADICULOPATHY, CHRONIC: ICD-10-CM

## 2024-07-31 DIAGNOSIS — Z12.31 ENCOUNTER FOR SCREENING MAMMOGRAM FOR MALIGNANT NEOPLASM OF BREAST: ICD-10-CM

## 2024-07-31 DIAGNOSIS — E78.5 HYPERLIPIDEMIA, UNSPECIFIED HYPERLIPIDEMIA TYPE: ICD-10-CM

## 2024-07-31 DIAGNOSIS — E11.9 TYPE 2 DIABETES MELLITUS WITHOUT COMPLICATION, WITHOUT LONG-TERM CURRENT USE OF INSULIN: Primary | ICD-10-CM

## 2024-07-31 DIAGNOSIS — E11.9 TYPE 2 DIABETES MELLITUS WITHOUT COMPLICATION, WITH LONG-TERM CURRENT USE OF INSULIN: ICD-10-CM

## 2024-07-31 DIAGNOSIS — Z79.4 TYPE 2 DIABETES MELLITUS WITHOUT COMPLICATION, WITH LONG-TERM CURRENT USE OF INSULIN: ICD-10-CM

## 2024-07-31 DIAGNOSIS — E11.9 TYPE 2 DIABETES MELLITUS WITHOUT COMPLICATION, WITHOUT LONG-TERM CURRENT USE OF INSULIN: ICD-10-CM

## 2024-07-31 DIAGNOSIS — Z12.39 ENCOUNTER FOR SCREENING FOR MALIGNANT NEOPLASM OF BREAST, UNSPECIFIED SCREENING MODALITY: ICD-10-CM

## 2024-07-31 LAB
ALBUMIN SERPL-MCNC: 4 G/DL (ref 3.4–4.8)
ALBUMIN/GLOB SERPL: 1.1 RATIO (ref 1.1–2)
ALP SERPL-CCNC: 80 UNIT/L (ref 40–150)
ALT SERPL-CCNC: 20 UNIT/L (ref 0–55)
ANION GAP SERPL CALC-SCNC: 10 MEQ/L
AST SERPL-CCNC: 20 UNIT/L (ref 5–34)
BASOPHILS # BLD AUTO: 0.02 X10(3)/MCL
BASOPHILS NFR BLD AUTO: 0.2 %
BILIRUB SERPL-MCNC: 0.7 MG/DL
BUN SERPL-MCNC: 18.8 MG/DL (ref 9.8–20.1)
CALCIUM SERPL-MCNC: 10.5 MG/DL (ref 8.4–10.2)
CHLORIDE SERPL-SCNC: 104 MMOL/L (ref 98–107)
CHOLEST SERPL-MCNC: 226 MG/DL
CHOLEST/HDLC SERPL: 4 {RATIO} (ref 0–5)
CO2 SERPL-SCNC: 26 MMOL/L (ref 23–31)
CREAT SERPL-MCNC: 0.84 MG/DL (ref 0.55–1.02)
CREAT UR-MCNC: 39.9 MG/DL (ref 45–106)
CREAT/UREA NIT SERPL: 22
EOSINOPHIL # BLD AUTO: 0.19 X10(3)/MCL (ref 0–0.9)
EOSINOPHIL NFR BLD AUTO: 1.9 %
ERYTHROCYTE [DISTWIDTH] IN BLOOD BY AUTOMATED COUNT: 12 % (ref 11.5–17)
EST. AVERAGE GLUCOSE BLD GHB EST-MCNC: 139.9 MG/DL
GFR SERPLBLD CREATININE-BSD FMLA CKD-EPI: >60 ML/MIN/1.73/M2
GLOBULIN SER-MCNC: 3.5 GM/DL (ref 2.4–3.5)
GLUCOSE SERPL-MCNC: 92 MG/DL (ref 82–115)
HBA1C MFR BLD: 6.5 %
HCT VFR BLD AUTO: 41.1 % (ref 37–47)
HDLC SERPL-MCNC: 55 MG/DL (ref 35–60)
HGB BLD-MCNC: 13.7 G/DL (ref 12–16)
IMM GRANULOCYTES # BLD AUTO: 0.03 X10(3)/MCL (ref 0–0.04)
IMM GRANULOCYTES NFR BLD AUTO: 0.3 %
LDLC SERPL CALC-MCNC: 142 MG/DL (ref 50–140)
LYMPHOCYTES # BLD AUTO: 3.61 X10(3)/MCL (ref 0.6–4.6)
LYMPHOCYTES NFR BLD AUTO: 35.4 %
MCH RBC QN AUTO: 31.1 PG (ref 27–31)
MCHC RBC AUTO-ENTMCNC: 33.3 G/DL (ref 33–36)
MCV RBC AUTO: 93.2 FL (ref 80–94)
MICROALBUMIN UR-MCNC: 12.2 UG/ML
MICROALBUMIN/CREAT RATIO PNL UR: 30.6 MG/GM CR (ref 0–30)
MONOCYTES # BLD AUTO: 0.58 X10(3)/MCL (ref 0.1–1.3)
MONOCYTES NFR BLD AUTO: 5.7 %
NEUTROPHILS # BLD AUTO: 5.78 X10(3)/MCL (ref 2.1–9.2)
NEUTROPHILS NFR BLD AUTO: 56.5 %
NRBC BLD AUTO-RTO: 0 %
PLATELET # BLD AUTO: 338 X10(3)/MCL (ref 130–400)
PMV BLD AUTO: 9.2 FL (ref 7.4–10.4)
POTASSIUM SERPL-SCNC: 4.9 MMOL/L (ref 3.5–5.1)
PROT SERPL-MCNC: 7.5 GM/DL (ref 5.8–7.6)
RBC # BLD AUTO: 4.41 X10(6)/MCL (ref 4.2–5.4)
SODIUM SERPL-SCNC: 140 MMOL/L (ref 136–145)
TRIGL SERPL-MCNC: 144 MG/DL (ref 37–140)
VLDLC SERPL CALC-MCNC: 29 MG/DL
WBC # BLD AUTO: 10.21 X10(3)/MCL (ref 4.5–11.5)

## 2024-07-31 PROCEDURE — 36415 COLL VENOUS BLD VENIPUNCTURE: CPT

## 2024-07-31 PROCEDURE — 82570 ASSAY OF URINE CREATININE: CPT

## 2024-07-31 PROCEDURE — 99215 OFFICE O/P EST HI 40 MIN: CPT | Mod: PBBFAC

## 2024-07-31 PROCEDURE — 80053 COMPREHEN METABOLIC PANEL: CPT

## 2024-07-31 PROCEDURE — 85025 COMPLETE CBC W/AUTO DIFF WBC: CPT

## 2024-07-31 PROCEDURE — 80061 LIPID PANEL: CPT

## 2024-07-31 PROCEDURE — 83036 HEMOGLOBIN GLYCOSYLATED A1C: CPT

## 2024-07-31 PROCEDURE — 82043 UR ALBUMIN QUANTITATIVE: CPT

## 2024-07-31 RX ORDER — CYCLOBENZAPRINE HCL 10 MG
10 TABLET ORAL NIGHTLY
COMMUNITY
Start: 2024-07-22

## 2024-07-31 RX ORDER — DICLOFENAC POTASSIUM 50 MG/1
50 TABLET, FILM COATED ORAL 2 TIMES DAILY
COMMUNITY
Start: 2024-07-22

## 2024-07-31 NOTE — PROGRESS NOTES
[facility] INTERNAL MEDICINE  OUTPATIENT OFFICE VISIT NOTE    SUBJECTIVE:      Chief Complaint: Follow-up     HPI: Ursula Sampson is a 68 y.o. yo female w/ PMH of T2DM, HLD, HTN, CKD Stage II who presents for follow-up.  Patient now following with urology for urinary incontinance and frequent UTI's.  She is reporting continued lower back pain which is aggravated by movement and does not improve with OTC therapy.  She states the pain is persistent and has been present over the last year although was episodic in nature prior to this.  Otherwise patient has no new acute complaints and reports good compliance with medication regimen.     Past Medical History:  HTN, HLD, T2DM, CKD Stage 2    Past Surgical History:  Hx of cholecystectomy  Hx of D&C in    Hx of      Right shoulder replacement in 2020     Family History:  Mother had hx colon cancer, diabetes, sick sinus syndrome, and dementia     Social History:   reports that she has never smoked. She has never been exposed to tobacco smoke. She has never used smokeless tobacco. She reports current alcohol use of about 2.0 standard drinks of alcohol per week. She reports that she does not use drugs.     Allergies:  is allergic to erythromycin and sulfamethoxazole-trimethoprim.     Home Medications:  Prior to Admission medications    Medication Sig Start Date End Date Taking? Authorizing Provider   albuterol (PROVENTIL) 2.5 mg /3 mL (0.083 %) nebulizer solution SMARTSIG:3 Milliliter(s) Via Nebulizer Every 4-6 Hours PRN 22  Yes Historical Provider   aspirin 81 MG Chew Take 81 mg by mouth Daily. 2/15/22  Yes Historical Provider   cetirizine (ZYRTEC) 10 MG tablet Take 10 tablets by mouth Daily. 21  Yes Historical Provider   glyBURIDE (DIABETA) 1.25 MG Tab Take 1 tablet (1.25 mg total) by mouth once daily. 10/27/22  Yes Elena Mcgee MD   Lactobacillus acidophilus 500 million cell Cap Take 1 capsule by mouth 2 (two) times a day. 21  Yes  "Historical Provider   mecobalamin, vitamin B12, (B12 ACTIVE) 1,000 mcg Chew Take 1,000 mcg by mouth Daily. 2/15/22  Yes Historical Provider   atorvastatin (LIPITOR) 40 MG tablet Take 40 mg by mouth every evening. 12/2/22   Historical Provider   lisinopriL (PRINIVIL,ZESTRIL) 30 MG tablet Take 1 tablet (30 mg total) by mouth once daily.  Patient not taking: Reported on 1/17/2023 8/23/22   Elena Mcgee MD   metFORMIN (GLUCOPHAGE) 1000 MG tablet Take 1 tablet (1,000 mg total) by mouth 2 (two) times daily.  Patient not taking: Reported on 1/17/2023 6/30/22   Danny Mcdonald DO   paroxetine (PAXIL) 20 MG tablet Take 1 tablet (20 mg total) by mouth once daily.  Patient not taking: Reported on 1/17/2023 8/23/22   Elena Mcgee MD     Review of Systems   Constitutional:  Negative for weight loss.   Eyes:  Negative for blurred vision.   Respiratory:  Negative for cough and shortness of breath.    Cardiovascular:  Negative for chest pain and leg swelling.   Gastrointestinal:  Negative for abdominal pain, constipation, diarrhea, nausea and vomiting.   Genitourinary:  Negative for dysuria, frequency, hematuria and urgency.   Musculoskeletal:  Positive for back pain.   Neurological:  Negative for dizziness, tremors, seizures, weakness and headaches.   Endo/Heme/Allergies:  Negative for polydipsia.   Psychiatric/Behavioral:  The patient is not nervous/anxious.          OBJECTIVE:     Vital signs:   /71 (BP Location: Right arm, Patient Position: Sitting, BP Method: Medium (Automatic))   Pulse 64   Temp 97.9 °F (36.6 °C) (Oral)   Resp 18   Ht 4' 10.66" (1.49 m)   Wt 88.8 kg (195 lb 12.8 oz)   SpO2 97%   BMI 40.01 kg/m²      Physical Examination:  General: Patient resting comfortably, in no acute distress   Eye: PERRLA, EOMI, clear conjunctiva, eyelids normal  HENT: Head-normocephalic and atraumatic  Neck: full range of motion, no thyromegaly or lymphadenopathy, trachea midline, supple, no palpable thyroid " nodules  Respiratory: clear to auscultation bilaterally without wheezes, rales, rhonchi  Cardiovascular: regular rate and rhythm without murmurs.  No gallops or rubs no JVD.  Capillary refill within normal limits.  Gastrointestinal: soft, non-tender, non-distended with normal bowel sounds, without masses to palpation  Genitourinary: no CVA tenderness to palpation  Musculoskeletal: full range of motion of all extremities without limitation or discomfort.  Pain in lumbar back associated with bending over.  Leg raise negative.  No tenderness to palpation over spine.  Mild pain associated with range of motion.   Integumentary: no rashes or skin lesions present  Neurologic: no signs of peripheral neurological deficit, motor/sensory function intact  Psychiatric:  alert and oriented, cognitive function intact, cooperative with exam, good eye contact, judgement and insight intact, mood and affect full range.   Foot exam: no lesions or ulcers present, sensation intact, pulses 2+, toenails appear well maintained, skin is not dry or cracked    Labs:  CMP:   Lab Results   Component Value Date    GLUCOSE 92 07/31/2024    CALCIUM 10.5 (H) 07/31/2024    ALBUMIN 4.0 07/31/2024     07/31/2024    K 4.9 07/31/2024    CO2 26 07/31/2024     07/31/2024    BUN 18.8 07/31/2024    CREATININE 0.84 07/31/2024    ALKPHOS 80 07/31/2024    ALT 20 07/31/2024    AST 20 07/31/2024    BILITOT 0.7 07/31/2024      CBC:   Lab Results   Component Value Date    WBC 10.21 07/31/2024    HGB 13.7 07/31/2024    HCT 41.1 07/31/2024    MCV 93.2 07/31/2024    RDW 12.0 07/31/2024     FLP:   Lab Results   Component Value Date    CHOL 226 (H) 07/31/2024    HDL 55 07/31/2024    .00 (H) 07/31/2024    TRIG 144 (H) 07/31/2024    TOTALCHOLEST 4 07/31/2024     DM:   Lab Results   Component Value Date    HGBA1C 6.5 07/31/2024    .9 07/31/2024    CREATININE 0.84 07/31/2024    CREATRANDUR 39.9 (L) 07/31/2024     Thyroid:   Lab Results   Component  Value Date    TSH 1.237 01/17/2023     LFTs:   Lab Results   Component Value Date    LABPROT 7.5 07/31/2024    ALBUMIN 4.0 07/31/2024    AST 20 07/31/2024    ALT 20 07/31/2024    ALKPHOS 80 07/31/2024     Anemia:   Lab Results   Component Value Date    ODAKELSA39 >2,000 (H) 01/17/2023       ASSESSMENT & PLAN:     Diabetes  - Continue metformin 1000 bid   -previously attempted Ozempic but patient was unable to tolerate secondary to GI upset and also states the medication was too expensive.   -patient is interested in trying Victoza today for added weight-loss benefit.  Will prescribe today and discontinue glyburide   -A1c 6.7 at last visit, 6.5 today   -follows with outside ophthalmologist    -foot exam UTD     HLD  - continue Atorvastatin 40  - last lipid panel 1/17/2023 shows cholesterol wnl but elevated triglycerides.   - patient attempting lifestyle modifications, will continue to monitor  - repeat lipid panel ordered today     HTN  CKD II  - continue lisinopril 30mg   - CMP, microalbumin/creatinine ratio wnl on 1/17/2023     Depression - well controlled   -Continue Paxil 20  -has been on this medication since 2012 after her  passed away.  Had conversation with patient today about discontinuing as it is not intended to be long-term therapy.  Patient states she would feel more comfortable staying on the medication as she is worried about reoccurrence of depression and still struggles with personal issues that she believes may exacerbate her symptoms if she does not continue with medication.     Urgency Incontinence  Recurrent UTI's  -continue oxybutynin, patient endorses improvement in symptoms    -following with urology      Low back pain   -patient continuing to endorse lower back pain over beginning approx 6 months ago that is unrelieved with OTC medications and is made worse with activity.  She also reports occasional numbness to her left lateral thigh.  Denies any spinal tenderness.    -lumbar x-ray  showing narrowing at multiple levels but specially L1-L2 marginal osteophytes also identified at multiple levels as well as degenerative changes.   -counseled on conservative management as well as stretches to perform at home   -ordering MRI today for further workup   -will refer patient to physical therapy for continued treatment and assessment.      Preventative Care  -Flu Vaccine: received 10/24/2023   -TDap Vaccine: 2019  -Herpes Zoster Vaccine: completed 2 dose shingles series today feb 2021  -Pneumovax: received pneumovax 20 on 1/17/2023   -Recent FIT Test: cologuard negative 5/24/2023   -Recent Colonoscopy: as above  -Lung Cancer Screening: never smoker  -DEXA Scan: negative 2/8/2023  -Recent Pap Smear: refusing   -Recent Mammogram: BIRADS 1 2/9/2023, reordered today    -Diabetic Foot exam: performed 5/9/2023  -Diabetic Eye Exam: fundus check completed in 05/2021, following opthalmology   -Diabetic Nephropathy Screening: no microalbuminuria in 5/2021, reordered today for next visit   -completed covid vaccinations and booster in dec 2021  -Hep C/HIV: ordered today     Lab work at next visit   Return to clinic in 4 month(s).    Elena Mcgee MD  [facility] Internal Medicine, [facility]

## 2024-08-27 ENCOUNTER — TELEPHONE (OUTPATIENT)
Dept: UROLOGY | Facility: CLINIC | Age: 69
End: 2024-08-27
Payer: COMMERCIAL

## 2024-08-27 NOTE — TELEPHONE ENCOUNTER
Thank you!    ----- Message from Rubina Cathi sent at 8/27/2024  3:20 PM CDT -----  Regarding: RE: Reschedule Urology Appt  DONE!  ----- Message -----  From: Silvina Link RN  Sent: 8/27/2024   3:01 PM CDT  To: Rubina Winkler  Subject: Reschedule Urology Appt                          Pt has appt [id number] but has not gotten her renal US done. Please call & reschedule appt & remind pt to get US done beforehand. Thank you!

## 2024-09-03 DIAGNOSIS — Z79.4 TYPE 2 DIABETES MELLITUS WITHOUT COMPLICATION, WITH LONG-TERM CURRENT USE OF INSULIN: ICD-10-CM

## 2024-09-03 DIAGNOSIS — F41.9 ANXIETY: ICD-10-CM

## 2024-09-03 DIAGNOSIS — I10 HYPERTENSION, UNSPECIFIED TYPE: ICD-10-CM

## 2024-09-03 DIAGNOSIS — E11.9 TYPE 2 DIABETES MELLITUS WITHOUT COMPLICATION, WITH LONG-TERM CURRENT USE OF INSULIN: ICD-10-CM

## 2024-09-04 RX ORDER — LISINOPRIL 30 MG/1
30 TABLET ORAL DAILY
Qty: 90 TABLET | Refills: 3 | Status: SHIPPED | OUTPATIENT
Start: 2024-09-04

## 2024-09-04 RX ORDER — METFORMIN HYDROCHLORIDE 1000 MG/1
1000 TABLET ORAL 2 TIMES DAILY
Qty: 180 TABLET | Refills: 3 | Status: SHIPPED | OUTPATIENT
Start: 2024-09-04

## 2024-09-04 RX ORDER — PAROXETINE HYDROCHLORIDE 20 MG/1
20 TABLET, FILM COATED ORAL DAILY
Qty: 90 TABLET | Refills: 3 | Status: SHIPPED | OUTPATIENT
Start: 2024-09-04

## 2024-09-10 RX ORDER — ATORVASTATIN CALCIUM 40 MG/1
40 TABLET, FILM COATED ORAL NIGHTLY
Qty: 90 TABLET | Refills: 3 | Status: SHIPPED | OUTPATIENT
Start: 2024-09-10

## 2024-09-10 RX ORDER — OXYBUTYNIN CHLORIDE 5 MG/1
5 TABLET ORAL 3 TIMES DAILY
Qty: 90 TABLET | Refills: 11 | Status: SHIPPED | OUTPATIENT
Start: 2024-09-10 | End: 2025-09-10

## 2024-11-04 ENCOUNTER — HOSPITAL ENCOUNTER (EMERGENCY)
Facility: HOSPITAL | Age: 69
Discharge: HOME | End: 2024-11-04
Attending: EMERGENCY MEDICINE
Payer: COMMERCIAL

## 2024-11-04 VITALS
DIASTOLIC BLOOD PRESSURE: 74 MMHG | TEMPERATURE: 98 F | SYSTOLIC BLOOD PRESSURE: 120 MMHG | RESPIRATION RATE: 18 BRPM | HEIGHT: 60 IN | HEART RATE: 77 BPM | WEIGHT: 196.56 LBS | BODY MASS INDEX: 38.59 KG/M2 | OXYGEN SATURATION: 98 %

## 2024-11-04 DIAGNOSIS — M54.16 LUMBAR RADICULOPATHY: Primary | ICD-10-CM

## 2024-11-04 PROCEDURE — 99283 EMERGENCY DEPT VISIT LOW MDM: CPT

## 2024-11-04 RX ORDER — HYDROCODONE BITARTRATE AND ACETAMINOPHEN 5; 325 MG/1; MG/1
1 TABLET ORAL EVERY 6 HOURS PRN
Qty: 12 TABLET | Refills: 0 | Status: SHIPPED | OUTPATIENT
Start: 2024-11-04

## 2024-11-04 NOTE — ED PROVIDER NOTES
Encounter Date: 11/4/2024       History     Chief Complaint   Patient presents with    Leg Pain     Left buttock pain radiating down leg x3 weeks that started after bending over the sink to grab tooth brush.     Patient with pmhx of HTN, DM, and HLD presents today c/o left buttock pain that radiates down left leg on going for about 3 weeks. Pain is worse with movement. Pain worsened his morning when she bent over slighting while brushing teeth. Patient says she has been seen by her PCP and at an urgent care for this. She has been 2 steroid injections, a toradol injection, prednisone, flexeril, tizanidine, and oral toradol. She has taken all as prescribed with no improvement. Patient says she has also tried taking tramadol that she has at home with no improvement. Denies blunt injury or trauma, focal weakness, paresthesia, bladder/bowel incontinence, saddle numbness, dysuria, hematuria.      The history is provided by the patient. No  was used.     Review of patient's allergies indicates:   Allergen Reactions    Erythromycin      Other reaction(s): THROAT PAIN    Sulfamethoxazole-trimethoprim Other (See Comments)     Father. Pt. does not want     Past Medical History:   Diagnosis Date    Depression     Diabetes mellitus     History of cholecystectomy     Hypertension      No past surgical history on file.  Family History   Problem Relation Name Age of Onset    Colon cancer Mother      Diabetes Mother      Stroke Father       Social History     Tobacco Use    Smoking status: Never     Passive exposure: Never    Smokeless tobacco: Never   Substance Use Topics    Alcohol use: Yes     Alcohol/week: 2.0 standard drinks of alcohol     Types: 2 Cans of beer per week    Drug use: Never     Review of Systems   Constitutional:  Negative for chills and fever.   Respiratory:  Negative for cough and shortness of breath.    Cardiovascular:  Negative for chest pain.   Gastrointestinal:  Negative for abdominal  pain, diarrhea, nausea and vomiting.   Genitourinary:  Negative for dysuria, flank pain and hematuria.   Musculoskeletal:  Positive for back pain. Negative for gait problem.   Skin:  Negative for rash.   Neurological:  Negative for syncope, light-headedness and headaches.   All other systems reviewed and are negative.      Physical Exam     Initial Vitals [11/04/24 1232]   BP Pulse Resp Temp SpO2   (!) 172/79 79 18 98.1 °F (36.7 °C) 98 %      MAP       --         Physical Exam    Vitals reviewed.  Constitutional: She is not diaphoretic. No distress.   HENT:   Head: Normocephalic and atraumatic. Mouth/Throat: Oropharynx is clear and moist. No oropharyngeal exudate.   Eyes: Conjunctivae and EOM are normal.   Neck: Neck supple.   Cardiovascular:  Normal rate, regular rhythm, normal heart sounds and intact distal pulses.           Pulmonary/Chest: Breath sounds normal. No respiratory distress.   Abdominal: Abdomen is soft. She exhibits no distension.   Musculoskeletal:         General: No edema.      Cervical back: Neck supple.      Lumbar back: Tenderness present. No swelling, edema, deformity, signs of trauma, lacerations, spasms or bony tenderness. Normal range of motion. Positive left straight leg raise test. No scoliosis.        Back:      Neurological: She is alert and oriented to person, place, and time. She has normal strength. No sensory deficit. GCS score is 15. GCS eye subscore is 4. GCS verbal subscore is 5. GCS motor subscore is 6.   Skin: Skin is warm and dry. Capillary refill takes less than 2 seconds. No rash noted.   Psychiatric: She has a normal mood and affect.         ED Course   Procedures  Labs Reviewed - No data to display       Imaging Results    None          Medications - No data to display  Medical Decision Making  Ddx: lumbar radiculopathy, lumbar strain, muscle spasms, amongst others    Amount and/or Complexity of Data Reviewed  External Data Reviewed: radiology and notes.     Details:  EXAMINATION:  XR LUMBAR SPINE 2 OR 3 VIEWS     CPT: 40603     CLINICAL HISTORY:  Low back pain, unspecified     FINDINGS:  There are 5 non rib-bearing vertebral bodies there is evidence of retrolisthesis of L2 on L3 degenerative changes with narrowing at multiple levels but specially L1-L2 marginal osteophytes also identified at multiple levels.     There are degenerative changes of the posterior elements at L3-L4 L4-L5 and L5-S1.     No acute fractures or dislocations identified.     Soft tissue calcifications are seen     Impression:     Degenerative changes        Electronically signed by:Yuniel Carmona  Date:                                            10/24/2023  Time:                                           10:46      Exam Ended: 10/24/23 10:29 CDT        Risk  Prescription drug management.  Risk Details: Given strict ED return precautions. I have spoken with the patient and/or caregivers. I have explained the patient's condition, diagnoses and treatment plan based on the information available to me at this time. I have answered the patient's and/or caregiver's questions and addressed any concerns. The patient and/or caregivers have as good an understanding of the patient's diagnosis, condition and treatment plan as can be expected at this point. The vital signs have been stable. The patient's condition is stable and appropriate for discharge from the emergency department.      The patient will pursue further outpatient evaluation with the primary care physician or other designated or consulting physician as outlined in the discharge instructions. The patient and/or caregivers are agreeable to this plan of care and follow-up instructions have been explained in detail. The patient and/or caregivers have received these instructions in written format and have expressed an understanding of the discharge instructions. The patient and/or caregivers are aware that any significant change in condition or worsening  of symptoms should prompt an immediate return to this or the closest emergency department or a call to 911                       ED Course as of 11/04/24 1442   Mon Nov 04, 2024   1441 I will prescribed her a short course of norco as she has tried nsaids, tylenol muscle relaxers, tramadol, topical pain relief creams with no improvement. I have advised her to contact her PCP for further management and evaluation. She is stable for discharge. ED precautions given.  [SA]      ED Course User Index  [SA] Sommer Salomon PA                     Clinical Impression:  Final diagnoses:  [M54.16] Lumbar radiculopathy (Primary)      ED Disposition Condition    Discharge Stable          ED Prescriptions       Medication Sig Dispense Start Date End Date Auth. Provider    HYDROcodone-acetaminophen (NORCO) 5-325 mg per tablet Take 1 tablet by mouth every 6 (six) hours as needed for Pain. 12 tablet 11/4/2024 -- Sommer Salomon PA          Follow-up Information       Follow up With Specialties Details Why Contact Info    [facility] Emergency Dept Emergency Medicine  If symptoms worsen return to ED immediately [address] [facility] [id number] [phone]    Elena Mcgee MD Internal Medicine In 1 day  [address] [facility] [address]  [phone]               Sommer Salomon PA  11/04/24 1442

## 2024-11-05 ENCOUNTER — TELEPHONE (OUTPATIENT)
Dept: INTERNAL MEDICINE | Facility: CLINIC | Age: 69
End: 2024-11-05
Payer: COMMERCIAL

## 2024-11-05 NOTE — TELEPHONE ENCOUNTER
TELEPHONE CALL:      MSBrent HUTSONIA,       MS. PENDLETON ASKED THAT YOU GIVE HER A CALL.  SHE MENTIONED THAT SHE WANTED TO SPEAK WITH YOU ABOUT SOME PAIN THAT SHE IS HAVING.

## 2024-11-06 NOTE — TELEPHONE ENCOUNTER
Called patient ID and  verified, patient is stating she is having sciatic pain x 3 weeks now and she has taken steroids, pain medication, and muscle relaxer but nothing is helping, states she barely can walk. Patient is scheduled a visit with a staff doctor in a week. I informed patient that in the meantime she should report to the ED or urgent care if the pain persist. Patient verbalized an understanding and call ended.

## 2024-11-13 ENCOUNTER — LAB VISIT (OUTPATIENT)
Dept: LAB | Facility: HOSPITAL | Age: 69
End: 2024-11-13
Attending: STUDENT IN AN ORGANIZED HEALTH CARE EDUCATION/TRAINING PROGRAM
Payer: COMMERCIAL

## 2024-11-13 ENCOUNTER — OFFICE VISIT (OUTPATIENT)
Dept: INTERNAL MEDICINE | Facility: CLINIC | Age: 69
End: 2024-11-13
Payer: COMMERCIAL

## 2024-11-13 VITALS
TEMPERATURE: 98 F | OXYGEN SATURATION: 100 % | RESPIRATION RATE: 19 BRPM | WEIGHT: 197 LBS | BODY MASS INDEX: 38.68 KG/M2 | HEART RATE: 79 BPM | HEIGHT: 60 IN | DIASTOLIC BLOOD PRESSURE: 80 MMHG | SYSTOLIC BLOOD PRESSURE: 160 MMHG

## 2024-11-13 DIAGNOSIS — M79.669 CALF PAIN, UNSPECIFIED LATERALITY: ICD-10-CM

## 2024-11-13 DIAGNOSIS — M54.30 SCIATICA, UNSPECIFIED LATERALITY: Primary | ICD-10-CM

## 2024-11-13 DIAGNOSIS — R60.0 LOCALIZED EDEMA: ICD-10-CM

## 2024-11-13 LAB — D DIMER PPP IA.FEU-MCNC: 0.31 UG/ML FEU (ref 0–0.5)

## 2024-11-13 PROCEDURE — 99215 OFFICE O/P EST HI 40 MIN: CPT | Mod: PBBFAC | Performed by: STUDENT IN AN ORGANIZED HEALTH CARE EDUCATION/TRAINING PROGRAM

## 2024-11-13 PROCEDURE — 36415 COLL VENOUS BLD VENIPUNCTURE: CPT

## 2024-11-13 PROCEDURE — 85379 FIBRIN DEGRADATION QUANT: CPT

## 2024-11-13 RX ORDER — TIZANIDINE 4 MG/1
4 TABLET ORAL NIGHTLY PRN
Qty: 30 TABLET | Refills: 0 | Status: SHIPPED | OUTPATIENT
Start: 2024-11-13

## 2024-11-13 RX ORDER — HYDROCODONE BITARTRATE AND ACETAMINOPHEN 5; 325 MG/1; MG/1
1 TABLET ORAL DAILY PRN
Qty: 14 TABLET | Refills: 0 | Status: SHIPPED | OUTPATIENT
Start: 2024-11-13

## 2024-11-13 RX ORDER — HYDROCODONE BITARTRATE AND ACETAMINOPHEN 5; 325 MG/1; MG/1
1 TABLET ORAL DAILY PRN
Qty: 14 TABLET | Refills: 0 | Status: CANCELLED | OUTPATIENT
Start: 2024-11-13

## 2024-11-13 RX ORDER — TIZANIDINE 4 MG/1
4 TABLET ORAL NIGHTLY PRN
Qty: 30 TABLET | Refills: 1 | Status: CANCELLED | OUTPATIENT
Start: 2024-11-13

## 2024-11-13 NOTE — PROGRESS NOTES
"INTERNAL MEDICINE CLINIC  CLINIC NOTE    Patient Name: Ursula Sampson  YOB: 1955    PRESENTING HISTORY       History of Present Illness:  Ms. Ursula Sampson is a 68 y.o. female w/ an active medical problem list including DM2, HLD, HTN, CKD II who is presenting for an ED follow up. She is Dr. Mcgee's patient, but I will be seeing her today for her ED f/u. She went to the ED for 3 weeks of left buttocks pain radiating down left leg, ongoing for about 3 weeks.    Ms. Sampson has had 1 month of left buttocks pain which is constant, 10/10, the pain is characterized as "tight". Has intermittent radiculopathy down her leg to her foot and has tingling in her foot. No changes in urinary/bowel habits or perineal numbness. No fevers/chills. The pain does wake her up at night. No inciting trauma 1 month ago. Her activities around that time included doing light gardening in her yard but nothing more than that.      ROS  As above    PAST HISTORY:     Past Medical History:   Diagnosis Date    Depression     Diabetes mellitus     History of cholecystectomy     Hypertension        No past surgical history on file.    Family History   Problem Relation Name Age of Onset    Colon cancer Mother      Diabetes Mother      Stroke Father         Social History     Socioeconomic History    Marital status:    Tobacco Use    Smoking status: Never     Passive exposure: Never    Smokeless tobacco: Never   Substance and Sexual Activity    Alcohol use: Yes     Alcohol/week: 2.0 standard drinks of alcohol     Types: 2 Cans of beer per week    Drug use: Never    Sexual activity: Not Currently     Social Drivers of Health     Financial Resource Strain: Low Risk  (1/17/2023)    Overall Financial Resource Strain (CARDIA)     Difficulty of Paying Living Expenses: Not hard at all   Food Insecurity: No Food Insecurity (1/17/2023)    Hunger Vital Sign     Worried About Running Out of Food in the Last Year: Never true     Ran Out " of Food in the Last Year: Never true   Transportation Needs: No Transportation Needs (1/17/2023)    PRAPARE - Transportation     Lack of Transportation (Medical): No     Lack of Transportation (Non-Medical): No   Physical Activity: Inactive (1/17/2023)    Exercise Vital Sign     Days of Exercise per Week: 0 days     Minutes of Exercise per Session: 0 min   Stress: No Stress Concern Present (1/17/2023)    [employer] [place] [employer] - Occupational Stress Questionnaire     Feeling of Stress : Only a little   Housing Stability: Low Risk  (1/17/2023)    Housing Stability Vital Sign     Unable to Pay for Housing in the Last Year: No     Number of Places Lived in the Last Year: 1     Unstable Housing in the Last Year: No                  MEDICATIONS & ALLERGIES:     Current Outpatient Medications on File Prior to Visit   Medication Sig    albuterol (PROVENTIL) 2.5 mg /3 mL (0.083 %) nebulizer solution SMARTSIG:3 Milliliter(s) Via Nebulizer Every 4-6 Hours PRN (Patient not taking: Reported on 7/31/2024)    aspirin 81 MG Chew Take 1 tablet (81 mg total) by mouth Daily.    atorvastatin (LIPITOR) 40 MG tablet Take 1 tablet (40 mg total) by mouth every evening.    blood sugar diagnostic Strp 1 strip by Misc.(Non-Drug; Combo Route) route 3 (three) times daily.    cetirizine (ZYRTEC) 10 MG tablet Take 10 tablets by mouth Daily.    cyclobenzaprine (FLEXERIL) 10 MG tablet Take 10 mg by mouth every evening.    diclofenac (CATAFLAM) 50 MG tablet Take 50 mg by mouth 2 (two) times daily.    HYDROcodone-acetaminophen (NORCO) 5-325 mg per tablet Take 1 tablet by mouth every 6 (six) hours as needed for Pain.    Lactobacillus acidophilus 500 million cell Cap Take 1 capsule by mouth 2 (two) times a day.    liraglutide 0.6 mg/0.1 mL, 18 mg/3 mL, subq PNIJ (VICTOZA 2-PATRICA) 0.6 mg/0.1 mL (18 mg/3 mL) PnIj pen Inject 0.6 mg into the skin once daily.    lisinopriL (PRINIVIL,ZESTRIL) 30 MG tablet Take 1 tablet (30 mg total) by mouth  once daily.    mecobalamin, vitamin B12, 1,000 mcg Chew Take 1,000 mcg by mouth Daily. (Patient not taking: Reported on 7/31/2024)    metFORMIN (GLUCOPHAGE) 1000 MG tablet Take 1 tablet (1,000 mg total) by mouth 2 (two) times daily.    ONETOUCH DELICA PLUS LANCET 30 gauge Misc 1 lancet by skin prick route once daily.    ONETOUCH VERIO FLEX METER Misc 1 m by skin prick route once daily.    oxybutynin (DITROPAN) 5 MG Tab Take 1 tablet (5 mg total) by mouth 3 (three) times daily.    paroxetine (PAXIL) 20 MG tablet Take 1 tablet (20 mg total) by mouth once daily.    vibegron 75 mg Tab Take 1 tablet (75 mg total) by mouth once daily. (Patient not taking: Reported on 7/31/2024)     No current facility-administered medications on file prior to visit.       Review of patient's allergies indicates:   Allergen Reactions    Erythromycin      Other reaction(s): THROAT PAIN    Sulfamethoxazole-trimethoprim Other (See Comments)     Father. Pt. does not want       OBJECTIVE:   Vital Signs:  There were no vitals filed for this visit.    No results found for this or any previous visit (from the past 24 hours).      Physical Exam    General - Appears comfortable, appropriatley conversive   Mental Status - alert and oriented x 3, speaking in logical, relevant sentences   HEENT - no rhinorrhea   Cardiac - RRR, no murmurs, rubs, or gallops; no edema in LE   Respiratory - breathing comfortably; clear to ascultation bilaterally   Abdominal - nondistended, soft, nontender to palpation   Extremities - LE, UE, and joints are nonerythematous and nonswollen   Skin - no rashes or bruises seen on skin  MSK - tenderness to palpation of lower, outer quadrant of buttocks. No erythema or sweling in the area and no mass was appreciated. Also has tenderness to posterior calf. No swelling in her legs. Normal motor strength in hip flexion, knee flexion/extension, and dorsiflexion/plantarflexion. Is able to flex her hip but has pain in her buttocks when  "doing so. No pain on passive hip flexion.    Straight leg raise produces radicular pain in her lateral calf.    Laboratory  Lab Results   Component Value Date    WBC 10.21 07/31/2024    HGB 13.7 07/31/2024    HCT 41.1 07/31/2024    MCV 93.2 07/31/2024     07/31/2024     @[id number](GLU,NA,K,Cl,CO2,BUN,Creatinine,Calcium,MG)@  No results found for: "INR", "PROTIME"  Lab Results   Component Value Date    HGBA1C 6.5 07/31/2024     No results for input(s): "POCTGLUCOSE" in the last 72 hours.      ASSESSMENT & PLAN:     Acute Care Visit:    Piriformis Syndrome w/ associated sciatic pain  -referred to PT office in [place] where she lives. Was told to let us know if they don't take her insurance. She can also reach out to her insurance and see who is covered in her area. PT I think will be the best help here  -continue conservative measures. Tylenol, NSAIDs. Short course of Norco, specially for once she starts physical therapy. Although tizanidine is an antispastic, she says it helps so I will give a limited course of this.  -has tenderness to posterior calf. Well's score of 1. Will get d-dimer  -did not get XR today, but can consider getting one on follow up visit  -physical exam was benign (no signs of inflammation, infection, underlying mass, etc) If pain persists despite conservative measures, then she may benefit from advanced imaging to rule out other etiologies of her pain.        RTC as scheduled with PCP in December    Dorian Zamudio MD  Internal Medicine  [facility]              "

## 2024-11-14 ENCOUNTER — TELEPHONE (OUTPATIENT)
Dept: INTERNAL MEDICINE | Facility: CLINIC | Age: 69
End: 2024-11-14
Payer: COMMERCIAL

## 2024-11-14 RX ORDER — GABAPENTIN 100 MG/1
200 CAPSULE ORAL 3 TIMES DAILY
Qty: 180 CAPSULE | Refills: 1 | Status: SHIPPED | OUTPATIENT
Start: 2024-11-14 | End: 2025-11-14

## 2024-12-16 ENCOUNTER — OFFICE VISIT (OUTPATIENT)
Dept: INTERNAL MEDICINE | Facility: CLINIC | Age: 69
End: 2024-12-16
Payer: COMMERCIAL

## 2024-12-16 ENCOUNTER — LAB VISIT (OUTPATIENT)
Dept: LAB | Facility: HOSPITAL | Age: 69
End: 2024-12-16
Payer: COMMERCIAL

## 2024-12-16 VITALS
HEIGHT: 60 IN | OXYGEN SATURATION: 97 % | BODY MASS INDEX: 38.02 KG/M2 | RESPIRATION RATE: 18 BRPM | SYSTOLIC BLOOD PRESSURE: 132 MMHG | DIASTOLIC BLOOD PRESSURE: 77 MMHG | TEMPERATURE: 98 F | HEART RATE: 85 BPM | WEIGHT: 193.63 LBS

## 2024-12-16 DIAGNOSIS — M54.30 SCIATICA, UNSPECIFIED LATERALITY: ICD-10-CM

## 2024-12-16 DIAGNOSIS — E83.52 HYPERCALCEMIA: ICD-10-CM

## 2024-12-16 DIAGNOSIS — R26.9 UNSPECIFIED ABNORMALITIES OF GAIT AND MOBILITY: ICD-10-CM

## 2024-12-16 DIAGNOSIS — E78.5 HYPERLIPIDEMIA, UNSPECIFIED HYPERLIPIDEMIA TYPE: ICD-10-CM

## 2024-12-16 DIAGNOSIS — E11.9 TYPE 2 DIABETES MELLITUS WITHOUT COMPLICATION, WITH LONG-TERM CURRENT USE OF INSULIN: Primary | ICD-10-CM

## 2024-12-16 DIAGNOSIS — Z79.4 TYPE 2 DIABETES MELLITUS WITHOUT COMPLICATION, WITH LONG-TERM CURRENT USE OF INSULIN: Primary | ICD-10-CM

## 2024-12-16 LAB
25(OH)D3+25(OH)D2 SERPL-MCNC: 31 NG/ML (ref 30–80)
ALBUMIN SERPL-MCNC: 4 G/DL (ref 3.4–4.8)
ALBUMIN/GLOB SERPL: 1.2 RATIO (ref 1.1–2)
ALP SERPL-CCNC: 83 UNIT/L (ref 40–150)
ALT SERPL-CCNC: 16 UNIT/L (ref 0–55)
ANION GAP SERPL CALC-SCNC: 8 MEQ/L
AST SERPL-CCNC: 16 UNIT/L (ref 5–34)
BILIRUB SERPL-MCNC: 0.5 MG/DL
BUN SERPL-MCNC: 16.7 MG/DL (ref 9.8–20.1)
CALCIUM SERPL-MCNC: 10.1 MG/DL (ref 8.4–10.2)
CHLORIDE SERPL-SCNC: 103 MMOL/L (ref 98–107)
CHOLEST SERPL-MCNC: 146 MG/DL
CHOLEST/HDLC SERPL: 3 {RATIO} (ref 0–5)
CO2 SERPL-SCNC: 29 MMOL/L (ref 23–31)
CREAT SERPL-MCNC: 0.83 MG/DL (ref 0.55–1.02)
CREAT/UREA NIT SERPL: 20
FOLATE SERPL-MCNC: 27.7 NG/ML (ref 7–31.4)
GFR SERPLBLD CREATININE-BSD FMLA CKD-EPI: >60 ML/MIN/1.73/M2
GLOBULIN SER-MCNC: 3.4 GM/DL (ref 2.4–3.5)
GLUCOSE SERPL-MCNC: 202 MG/DL (ref 82–115)
HBA1C MFR BLD: 6.9 %
HDLC SERPL-MCNC: 46 MG/DL (ref 35–60)
LDLC SERPL CALC-MCNC: 76 MG/DL (ref 50–140)
POTASSIUM SERPL-SCNC: 4.5 MMOL/L (ref 3.5–5.1)
PROT SERPL-MCNC: 7.4 GM/DL (ref 5.8–7.6)
PTH-INTACT SERPL-MCNC: 57.3 PG/ML (ref 8.7–77)
SODIUM SERPL-SCNC: 140 MMOL/L (ref 136–145)
TRIGL SERPL-MCNC: 118 MG/DL (ref 37–140)
VIT B12 SERPL-MCNC: 865 PG/ML (ref 213–816)
VLDLC SERPL CALC-MCNC: 24 MG/DL

## 2024-12-16 PROCEDURE — 82746 ASSAY OF FOLIC ACID SERUM: CPT

## 2024-12-16 PROCEDURE — 83970 ASSAY OF PARATHORMONE: CPT

## 2024-12-16 PROCEDURE — 83036 HEMOGLOBIN GLYCOSYLATED A1C: CPT | Mod: PBBFAC

## 2024-12-16 PROCEDURE — 80061 LIPID PANEL: CPT

## 2024-12-16 PROCEDURE — 82607 VITAMIN B-12: CPT

## 2024-12-16 PROCEDURE — 82306 VITAMIN D 25 HYDROXY: CPT

## 2024-12-16 PROCEDURE — 36415 COLL VENOUS BLD VENIPUNCTURE: CPT

## 2024-12-16 PROCEDURE — 80053 COMPREHEN METABOLIC PANEL: CPT

## 2024-12-16 PROCEDURE — 99215 OFFICE O/P EST HI 40 MIN: CPT | Mod: PBBFAC

## 2024-12-16 RX ORDER — GLYBURIDE 5 MG/1
5 TABLET ORAL
Qty: 90 TABLET | Refills: 3 | Status: SHIPPED | OUTPATIENT
Start: 2024-12-16 | End: 2025-12-16

## 2024-12-16 NOTE — PROGRESS NOTES
Attending Addendum:   Patient seen and examined in clinic. Management and Plan were discussed with resident. Care was reasonable and necessary.   Doreen Odell MD  [facility] - Internal Medicine

## 2024-12-16 NOTE — PROGRESS NOTES
[facility] INTERNAL MEDICINE  OUTPATIENT OFFICE VISIT NOTE    SUBJECTIVE:      Chief Complaint: Follow-up and Medication Refill     HPI: Ursula Sampson is a 69 y.o. yo female w/ PMH of T2DM, HLD, HTN, CKD Stage II who presents for follow-up. Reports recent ED visit for severe, left-sided buttox pain with radiatio into her lower extremity.  Was given pain medications at that time and had follow-up visit with Dr. Zamudio.  She reports improvement in symptoms since that visit but does states she continues to experience episodic lower back pain. Otherwise patient has no new acute complaints and reports good compliance with medication regimen.     Past Medical History:  HTN, HLD, T2DM, CKD Stage 2    Past Surgical History:  Hx of cholecystectomy  Hx of D&C in    Hx of  1984    Right shoulder replacement in 2020     Family History:  Mother had hx colon cancer, diabetes, sick sinus syndrome, and dementia     Social History:   reports that she has never smoked. She has never been exposed to tobacco smoke. She has never used smokeless tobacco. She reports current alcohol use of about 2.0 standard drinks of alcohol per week. She reports that she does not use drugs.     Allergies:  is allergic to erythromycin and sulfamethoxazole-trimethoprim.     Home Medications:  Prior to Admission medications    Medication Sig Start Date End Date Taking? Authorizing Provider   albuterol (PROVENTIL) 2.5 mg /3 mL (0.083 %) nebulizer solution SMARTSIG:3 Milliliter(s) Via Nebulizer Every 4-6 Hours PRN 22  Yes Historical Provider   aspirin 81 MG Chew Take 81 mg by mouth Daily. 2/15/22  Yes Historical Provider   cetirizine (ZYRTEC) 10 MG tablet Take 10 tablets by mouth Daily. 21  Yes Historical Provider   glyBURIDE (DIABETA) 1.25 MG Tab Take 1 tablet (1.25 mg total) by mouth once daily. 10/27/22  Yes Elena Mcgee MD   Lactobacillus acidophilus 500 million cell Cap Take 1 capsule by mouth 2 (two) times a day. 21   Yes Historical Provider   mecobalamin, vitamin B12, (B12 ACTIVE) 1,000 mcg Chew Take 1,000 mcg by mouth Daily. 2/15/22  Yes Historical Provider   atorvastatin (LIPITOR) 40 MG tablet Take 40 mg by mouth every evening. 12/2/22   Historical Provider   lisinopriL (PRINIVIL,ZESTRIL) 30 MG tablet Take 1 tablet (30 mg total) by mouth once daily.  Patient not taking: Reported on 1/17/2023 8/23/22   Elena Mcgee MD   metFORMIN (GLUCOPHAGE) 1000 MG tablet Take 1 tablet (1,000 mg total) by mouth 2 (two) times daily.  Patient not taking: Reported on 1/17/2023 6/30/22   Danny Mcdonald DO   paroxetine (PAXIL) 20 MG tablet Take 1 tablet (20 mg total) by mouth once daily.  Patient not taking: Reported on 1/17/2023 8/23/22   Elena Mcgee MD     Review of Systems   Constitutional:  Negative for weight loss.   Eyes:  Negative for blurred vision.   Respiratory:  Negative for cough and shortness of breath.    Cardiovascular:  Negative for chest pain and leg swelling.   Gastrointestinal:  Negative for abdominal pain, constipation, diarrhea, nausea and vomiting.   Genitourinary:  Negative for dysuria, frequency, hematuria and urgency.   Musculoskeletal:  Positive for back pain.   Neurological:  Negative for dizziness, tremors, seizures, weakness and headaches.   Endo/Heme/Allergies:  Negative for polydipsia.   Psychiatric/Behavioral:  The patient is not nervous/anxious.          OBJECTIVE:     Vital signs:   /77 (BP Location: Left arm, Patient Position: Sitting)   Pulse 85   Temp 97.9 °F (36.6 °C) (Oral)   Resp 18   Ht 5' (1.524 m)   Wt 87.8 kg (193 lb 9.6 oz)   SpO2 97%   BMI 37.81 kg/m²      Physical Examination:  General: Patient resting comfortably, in no acute distress   Eye: PERRLA, EOMI, clear conjunctiva, eyelids normal  HENT: Head-normocephalic and atraumatic  Neck: full range of motion, no thyromegaly or lymphadenopathy, trachea midline, supple, no palpable thyroid nodules  Respiratory: clear to  auscultation bilaterally without wheezes, rales, rhonchi  Cardiovascular: regular rate and rhythm without murmurs.  No gallops or rubs no JVD.  Capillary refill within normal limits.  Gastrointestinal: soft, non-tender, non-distended with normal bowel sounds, without masses to palpation  Genitourinary: no CVA tenderness to palpation  Musculoskeletal: full range of motion of all extremities without limitation or discomfort.    Integumentary: no rashes or skin lesions present  Neurologic: no signs of peripheral neurological deficit, motor/sensory function intact  Psychiatric:  alert and oriented, cognitive function intact, cooperative with exam, good eye contact, judgement and insight intact, mood and affect full range.   Foot exam: no lesions or ulcers present, sensation intact, pulses 2+, toenails appear well maintained, skin is not dry or cracked    Labs:  CMP:   Lab Results   Component Value Date    GLUCOSE 92 07/31/2024    CALCIUM 10.5 (H) 07/31/2024    ALBUMIN 4.0 07/31/2024     07/31/2024    K 4.9 07/31/2024    CO2 26 07/31/2024     07/31/2024    BUN 18.8 07/31/2024    CREATININE 0.84 07/31/2024    ALKPHOS 80 07/31/2024    ALT 20 07/31/2024    AST 20 07/31/2024    BILITOT 0.7 07/31/2024      CBC:   Lab Results   Component Value Date    WBC 10.21 07/31/2024    HGB 13.7 07/31/2024    HCT 41.1 07/31/2024    MCV 93.2 07/31/2024    RDW 12.0 07/31/2024     FLP:   Lab Results   Component Value Date    CHOL 226 (H) 07/31/2024    HDL 55 07/31/2024    .00 (H) 07/31/2024    TRIG 144 (H) 07/31/2024    TOTALCHOLEST 4 07/31/2024     DM:   Lab Results   Component Value Date    HGBA1C 6.5 07/31/2024    .9 07/31/2024    CREATININE 0.84 07/31/2024    CREATRANDUR 39.9 (L) 07/31/2024     Thyroid:   Lab Results   Component Value Date    TSH 1.237 01/17/2023     LFTs:   Lab Results   Component Value Date    LABPROT 7.5 07/31/2024    ALBUMIN 4.0 07/31/2024    AST 20 07/31/2024    ALT 20 07/31/2024    ALKPHOS  80 07/31/2024     Anemia:   Lab Results   Component Value Date    WOWYYFYF92 >2,000 (H) 01/17/2023       ASSESSMENT & PLAN:     T2DM  - Continue metformin 1000 bid and glyburide 5mg daily   -previously attempted Ozempic and Victoza but patient was unable to tolerate secondary to GI upset and also states the medications were too expensive.   -A1c 6.5 at last visit, 6.9 today   -follows with outside ophthalmologist    -foot exam performed today: skin intact without lesions, ulcers, or calluses.  2+ pulses bilaterally.  Sensation intact bilaterally in all areas on microfilament exam.     HLD  - continue Atorvastatin 40  - last lipid panel 1/17/2023 shows elevated LDL.  Patient reports complience with current regimen.  Repeating lipid panel today for further follow-up.  May consider increasing atorvastatin to 80mg if LDL remains significantly elevated above goal  - patient attempting lifestyle modifications, will continue to monitor    HTN  -/77 today   -continue lisinopril 30mg     CKD II  Hypercalcemia   - BUN/Cr, microalbumin/creatinine ratio wnl on 1/17/2023   - checking vitamin D and PTH level today as calcium was mildly elevated on previous CMP.  Will recheck today     Depression - well controlled   -Continue Paxil 20  -has been on this medication since 2012 after her  passed away.  Had conversation with patient today about discontinuing as it is not intended to be long-term therapy.  Patient states she would feel more comfortable staying on the medication as she is worried about reoccurrence of depression and still struggles with personal issues that she believes may exacerbate her symptoms if she does not continue with medication.     Urgency Incontinence  Recurrent UTI's  -continue oxybutynin, patient endorses improvement in symptoms    -following with urology      Low back pain   Sciatica   -patient continuing to endorse lower back pain beginning approx 1 year ago that is episodic in nature     -lumbar x-ray showing narrowing at multiple levels but specially L1-L2 marginal osteophytes also identified at multiple levels as well as degenerative changes.  -seen in the ED earlier this year for severe left-sided buttox pain with radiation into her lower leg.  Was given pain medications and had follow-up visit with Dr. Zamudio who counseled on conservative management and sent to physical therapy   -patient endorses improvement in symptoms today although states she does experience lower back pain episodically.    -counseled on conservative management as well as stretches to perform at home   -Has MRI lumbar spine scheduled 12/23/2024      Preventative Care  -Flu Vaccine: received flu vaccine at outside pharmacy 11/2024   -TDap Vaccine: 2019  -Herpes Zoster Vaccine: completed 2 dose shingles series today feb 2021  -Pneumovax: received pneumovax 20 on 1/17/2023   -Recent FIT Test: cologuard negative 5/24/2023   -Recent Colonoscopy: as above  -Lung Cancer Screening: never smoker  -DEXA Scan: negative 2/8/2023  -Recent Pap Smear: refusing   -Recent Mammogram: BIRADS 1 2/9/2023, reordered today    -Diabetic Foot exam: performed 5/9/2023  -Diabetic Eye Exam: fundus check completed in 05/2021, following opthalmology   -Diabetic Nephropathy Screening: no microalbuminuria in 5/2021, reordered today for next visit   -completed covid vaccinations and booster in dec 2021  -Hep C/HIV: ordered today     Lab work today   Return to clinic in 6 month(s).    Elena Mcgee MD  [facility] Internal Medicine, [facility]

## 2025-01-09 ENCOUNTER — TELEPHONE (OUTPATIENT)
Dept: INTERNAL MEDICINE | Facility: CLINIC | Age: 70
End: 2025-01-09
Payer: COMMERCIAL

## 2025-01-09 NOTE — TELEPHONE ENCOUNTER
Rx for Glyburide 5 mg tab is not a formulary drug. Insurance will not continue to pay for drug after pt received the maximum 30 days temporary supply that they are required to cover. Please aadvise

## 2025-01-13 RX ORDER — PIOGLITAZONEHYDROCHLORIDE 15 MG/1
15 TABLET ORAL DAILY
Qty: 90 TABLET | Refills: 3 | Status: SHIPPED | OUTPATIENT
Start: 2025-01-13 | End: 2026-01-13

## 2025-01-13 NOTE — TELEPHONE ENCOUNTER
Called patient ID and  verified, informed patient about the medication change and patient verbalized an understanding and call ended.

## 2025-01-13 NOTE — TELEPHONE ENCOUNTER
Attempted to contact patient several times to discuss medication change with no answer.  Please let patient know that I have sent a new prescription for Actos 15mg daily to her pharmacy to start taking as an alternative to glyburide to ensure good control of her diabetes.  She should continue to check her blood sugar at home for monitoring.  If she has any issues with this medication there are other options we can try.  If she would like to discuss further I am happy to give her a call.

## 2025-01-31 ENCOUNTER — HOSPITAL ENCOUNTER (OUTPATIENT)
Dept: RADIOLOGY | Facility: HOSPITAL | Age: 70
Discharge: HOME | End: 2025-01-31
Payer: COMMERCIAL

## 2025-01-31 ENCOUNTER — HOSPITAL ENCOUNTER (OUTPATIENT)
Dept: RADIOLOGY | Facility: HOSPITAL | Age: 70
Discharge: HOME | End: 2025-01-31
Attending: UROLOGY
Payer: COMMERCIAL

## 2025-01-31 DIAGNOSIS — N39.0 RECURRENT UTI: ICD-10-CM

## 2025-01-31 DIAGNOSIS — Z12.31 ENCOUNTER FOR SCREENING MAMMOGRAM FOR MALIGNANT NEOPLASM OF BREAST: ICD-10-CM

## 2025-01-31 DIAGNOSIS — Z12.39 ENCOUNTER FOR SCREENING FOR MALIGNANT NEOPLASM OF BREAST, UNSPECIFIED SCREENING MODALITY: ICD-10-CM

## 2025-01-31 DIAGNOSIS — M54.16 LUMBAR RADICULOPATHY, CHRONIC: ICD-10-CM

## 2025-01-31 PROCEDURE — 72148 MRI LUMBAR SPINE W/O DYE: CPT | Mod: TC

## 2025-01-31 PROCEDURE — 77067 SCR MAMMO BI INCL CAD: CPT | Mod: TC

## 2025-01-31 PROCEDURE — 77067 SCR MAMMO BI INCL CAD: CPT | Mod: 26,,, | Performed by: RADIOLOGY

## 2025-01-31 PROCEDURE — 76775 US EXAM ABDO BACK WALL LIM: CPT | Mod: TC

## 2025-01-31 PROCEDURE — 77063 BREAST TOMOSYNTHESIS BI: CPT | Mod: 26,,, | Performed by: RADIOLOGY

## 2025-02-04 ENCOUNTER — TELEPHONE (OUTPATIENT)
Dept: CASE MANAGEMENT | Facility: HOSPITAL | Age: 70
End: 2025-02-04
Payer: COMMERCIAL

## 2025-02-04 NOTE — TELEPHONE ENCOUNTER
Attempted to contact patient via telephone x2 with no answer.  I have reviewed the results of her MRI and would like to discuss the possibility of neurosurgery referral with her as there are some notable abnormalities on her imaging that would benefit from further evaluation.  I would also like to discuss with patient her current symptoms and if she continues to experience pain related to these findings and if there has been any escalation in her symptoms.  Please let patient know I am available for discussion at any time but otherwise we can follow-up at her next scheduled clinic visit in June.

## 2025-02-12 ENCOUNTER — HOSPITAL ENCOUNTER (OUTPATIENT)
Dept: RADIOLOGY | Facility: HOSPITAL | Age: 70
Discharge: HOME | End: 2025-02-12
Payer: COMMERCIAL

## 2025-02-12 DIAGNOSIS — R92.8 ABNORMAL MAMMOGRAM: ICD-10-CM

## 2025-02-12 PROCEDURE — 77065 DX MAMMO INCL CAD UNI: CPT | Mod: 26,LT,, | Performed by: STUDENT IN AN ORGANIZED HEALTH CARE EDUCATION/TRAINING PROGRAM

## 2025-02-12 PROCEDURE — 76642 ULTRASOUND BREAST LIMITED: CPT | Mod: TC,LT

## 2025-02-12 PROCEDURE — 76642 ULTRASOUND BREAST LIMITED: CPT | Mod: 26,LT,, | Performed by: STUDENT IN AN ORGANIZED HEALTH CARE EDUCATION/TRAINING PROGRAM

## 2025-02-12 PROCEDURE — 77065 DX MAMMO INCL CAD UNI: CPT | Mod: TC,LT

## 2025-02-12 PROCEDURE — 77061 BREAST TOMOSYNTHESIS UNI: CPT | Mod: 26,LT,, | Performed by: STUDENT IN AN ORGANIZED HEALTH CARE EDUCATION/TRAINING PROGRAM

## 2025-02-12 RX ORDER — GABAPENTIN 100 MG/1
200 CAPSULE ORAL 3 TIMES DAILY
Qty: 180 CAPSULE | Refills: 3 | Status: SHIPPED | OUTPATIENT
Start: 2025-02-12 | End: 2026-02-12

## 2025-05-05 ENCOUNTER — HOSPITAL ENCOUNTER (OUTPATIENT)
Dept: RADIOLOGY | Facility: HOSPITAL | Age: 70
Discharge: HOME | End: 2025-05-05
Payer: COMMERCIAL

## 2025-05-05 DIAGNOSIS — R92.8 ABNORMAL MAMMOGRAM: ICD-10-CM

## 2025-05-05 PROCEDURE — 76642 ULTRASOUND BREAST LIMITED: CPT | Mod: 26,LT,, | Performed by: STUDENT IN AN ORGANIZED HEALTH CARE EDUCATION/TRAINING PROGRAM

## 2025-05-05 PROCEDURE — 76642 ULTRASOUND BREAST LIMITED: CPT | Mod: TC,LT

## 2025-05-05 PROCEDURE — 77065 DX MAMMO INCL CAD UNI: CPT | Mod: 26,LT,, | Performed by: STUDENT IN AN ORGANIZED HEALTH CARE EDUCATION/TRAINING PROGRAM

## 2025-05-05 PROCEDURE — 77065 DX MAMMO INCL CAD UNI: CPT | Mod: TC,LT

## 2025-05-05 PROCEDURE — 77061 BREAST TOMOSYNTHESIS UNI: CPT | Mod: 26,LT,, | Performed by: STUDENT IN AN ORGANIZED HEALTH CARE EDUCATION/TRAINING PROGRAM

## 2025-06-02 ENCOUNTER — OFFICE VISIT (OUTPATIENT)
Dept: INTERNAL MEDICINE | Facility: CLINIC | Age: 70
End: 2025-06-02
Payer: COMMERCIAL

## 2025-06-02 VITALS
OXYGEN SATURATION: 100 % | SYSTOLIC BLOOD PRESSURE: 121 MMHG | BODY MASS INDEX: 38.41 KG/M2 | HEIGHT: 60 IN | HEART RATE: 65 BPM | RESPIRATION RATE: 18 BRPM | WEIGHT: 195.63 LBS | DIASTOLIC BLOOD PRESSURE: 71 MMHG | TEMPERATURE: 98 F

## 2025-06-02 DIAGNOSIS — Z79.4 TYPE 2 DIABETES MELLITUS WITHOUT COMPLICATION, WITH LONG-TERM CURRENT USE OF INSULIN: Primary | ICD-10-CM

## 2025-06-02 DIAGNOSIS — F41.9 ANXIETY: ICD-10-CM

## 2025-06-02 DIAGNOSIS — E11.9 TYPE 2 DIABETES MELLITUS WITHOUT COMPLICATION, WITH LONG-TERM CURRENT USE OF INSULIN: Primary | ICD-10-CM

## 2025-06-02 DIAGNOSIS — I10 HYPERTENSION, UNSPECIFIED TYPE: ICD-10-CM

## 2025-06-02 LAB — HBA1C MFR BLD: 7.6 %

## 2025-06-02 PROCEDURE — 83036 HEMOGLOBIN GLYCOSYLATED A1C: CPT | Mod: PBBFAC

## 2025-06-02 PROCEDURE — 99214 OFFICE O/P EST MOD 30 MIN: CPT | Mod: PBBFAC

## 2025-06-02 RX ORDER — GABAPENTIN 100 MG/1
200 CAPSULE ORAL 3 TIMES DAILY
Qty: 180 CAPSULE | Refills: 3 | Status: SHIPPED | OUTPATIENT
Start: 2025-06-02 | End: 2026-06-02

## 2025-06-02 RX ORDER — LISINOPRIL 30 MG/1
30 TABLET ORAL DAILY
Qty: 90 TABLET | Refills: 3 | Status: SHIPPED | OUTPATIENT
Start: 2025-06-02

## 2025-06-02 RX ORDER — PAROXETINE 20 MG/1
20 TABLET, FILM COATED ORAL DAILY
Qty: 90 TABLET | Refills: 3 | Status: SHIPPED | OUTPATIENT
Start: 2025-06-02

## 2025-06-02 RX ORDER — PIOGLITAZONE 15 MG/1
15 TABLET ORAL DAILY
Qty: 90 TABLET | Refills: 3 | Status: SHIPPED | OUTPATIENT
Start: 2025-06-02 | End: 2026-06-02

## 2025-06-02 RX ORDER — ATORVASTATIN CALCIUM 40 MG/1
40 TABLET, FILM COATED ORAL NIGHTLY
Qty: 90 TABLET | Refills: 3 | Status: SHIPPED | OUTPATIENT
Start: 2025-06-02

## 2025-06-02 RX ORDER — SELENIUM 50 MCG
1 TABLET ORAL 2 TIMES DAILY
Qty: 60 CAPSULE | Refills: 2 | Status: SHIPPED | OUTPATIENT
Start: 2025-06-02

## 2025-06-02 RX ORDER — METFORMIN HYDROCHLORIDE 1000 MG/1
1000 TABLET ORAL 2 TIMES DAILY
Qty: 180 TABLET | Refills: 3 | Status: SHIPPED | OUTPATIENT
Start: 2025-06-02

## 2025-06-02 RX ORDER — TIRZEPATIDE 2.5 MG/.5ML
INJECTION, SOLUTION SUBCUTANEOUS
Qty: 10 ML | Refills: 0 | Status: SHIPPED | OUTPATIENT
Start: 2025-06-02 | End: 2025-08-25

## 2025-06-02 RX ORDER — OXYBUTYNIN CHLORIDE 5 MG/1
5 TABLET ORAL 3 TIMES DAILY
Qty: 90 TABLET | Refills: 11 | Status: SHIPPED | OUTPATIENT
Start: 2025-06-02 | End: 2026-06-02

## 2025-06-02 NOTE — PROGRESS NOTES
[facility] INTERNAL MEDICINE  OUTPATIENT OFFICE VISIT NOTE    SUBJECTIVE:      Chief Complaint: No chief complaint on file.     HPI: Ursula Sampson is a 69 y.o. yo female w/ PMH of T2DM, HLD, HTN, CKD Stage II who presents for follow-up. Reports recent ED visit for severe, left-sided buttox pain with radiatio into her lower extremity.  Was given pain medications at that time and had follow-up visit with Dr. Zamudio.  She reports improvement in symptoms since that visit but does states she continues to experience episodic lower back pain. Otherwise patient has no new acute complaints and reports good compliance with medication regimen.     Past Medical History:  HTN, HLD, T2DM, CKD Stage 2    Past Surgical History:  Hx of cholecystectomy  Hx of D&C in    Hx of  1984    Right shoulder replacement in 2020     Family History:  Mother had hx colon cancer, diabetes, sick sinus syndrome, and dementia     Social History:   reports that she has never smoked. She has never been exposed to tobacco smoke. She has never used smokeless tobacco. She reports current alcohol use of about 2.0 standard drinks of alcohol per week. She reports that she does not use drugs.     Allergies:  is allergic to erythromycin and sulfamethoxazole-trimethoprim.     Home Medications:  Prior to Admission medications    Medication Sig Start Date End Date Taking? Authorizing Provider   albuterol (PROVENTIL) 2.5 mg /3 mL (0.083 %) nebulizer solution SMARTSIG:3 Milliliter(s) Via Nebulizer Every 4-6 Hours PRN 22  Yes Historical Provider   aspirin 81 MG Chew Take 81 mg by mouth Daily. 2/15/22  Yes Historical Provider   cetirizine (ZYRTEC) 10 MG tablet Take 10 tablets by mouth Daily. 21  Yes Historical Provider   glyBURIDE (DIABETA) 1.25 MG Tab Take 1 tablet (1.25 mg total) by mouth once daily. 10/27/22  Yes Elena Mcgee MD   Lactobacillus acidophilus 500 million cell Cap Take 1 capsule by mouth 2 (two) times a day. 21   Yes Historical Provider   mecobalamin, vitamin B12, (B12 ACTIVE) 1,000 mcg Chew Take 1,000 mcg by mouth Daily. 2/15/22  Yes Historical Provider   atorvastatin (LIPITOR) 40 MG tablet Take 40 mg by mouth every evening. 12/2/22   Historical Provider   lisinopriL (PRINIVIL,ZESTRIL) 30 MG tablet Take 1 tablet (30 mg total) by mouth once daily.  Patient not taking: Reported on 1/17/2023 8/23/22   Elena Mcgee MD   metFORMIN (GLUCOPHAGE) 1000 MG tablet Take 1 tablet (1,000 mg total) by mouth 2 (two) times daily.  Patient not taking: Reported on 1/17/2023 6/30/22   Danny Mcdonald DO   paroxetine (PAXIL) 20 MG tablet Take 1 tablet (20 mg total) by mouth once daily.  Patient not taking: Reported on 1/17/2023 8/23/22   Elena Mcgee MD     Review of Systems   Constitutional:  Negative for weight loss.   Eyes:  Negative for blurred vision.   Respiratory:  Negative for cough and shortness of breath.    Cardiovascular:  Negative for chest pain and leg swelling.   Gastrointestinal:  Negative for abdominal pain, constipation, diarrhea, nausea and vomiting.   Genitourinary:  Negative for dysuria, frequency, hematuria and urgency.   Musculoskeletal:  Positive for back pain.   Neurological:  Negative for dizziness, tremors, seizures, weakness and headaches.   Endo/Heme/Allergies:  Negative for polydipsia.   Psychiatric/Behavioral:  The patient is not nervous/anxious.          OBJECTIVE:     Vital signs:   There were no vitals taken for this visit.     Physical Examination:  General: Patient resting comfortably, in no acute distress   Eye: PERRLA, EOMI, clear conjunctiva, eyelids normal  HENT: Head-normocephalic and atraumatic  Neck: full range of motion, no thyromegaly or lymphadenopathy, trachea midline, supple, no palpable thyroid nodules  Respiratory: clear to auscultation bilaterally without wheezes, rales, rhonchi  Cardiovascular: regular rate and rhythm without murmurs.  No gallops or rubs no JVD.  Capillary refill  within normal limits.  Gastrointestinal: soft, non-tender, non-distended with normal bowel sounds, without masses to palpation  Genitourinary: no CVA tenderness to palpation  Musculoskeletal: full range of motion of all extremities without limitation or discomfort.    Integumentary: no rashes or skin lesions present  Neurologic: no signs of peripheral neurological deficit, motor/sensory function intact  Psychiatric:  alert and oriented, cognitive function intact, cooperative with exam, good eye contact, judgement and insight intact, mood and affect full range.   Foot exam: no lesions or ulcers present, sensation intact, pulses 2+, toenails appear well maintained, skin is not dry or cracked    Labs:  CMP:   Lab Results   Component Value Date    CALCIUM 10.1 12/16/2024    ALBUMIN 4.0 12/16/2024    PROT 7.4 12/16/2024     12/16/2024    K 4.5 12/16/2024    CO2 29 12/16/2024     12/16/2024    BUN 16.7 12/16/2024    CREATININE 0.83 12/16/2024    ALKPHOS 83 12/16/2024    ALT 16 12/16/2024    AST 16 12/16/2024    BILITOT 0.5 12/16/2024      CBC:   Lab Results   Component Value Date    WBC 10.21 07/31/2024    HGB 13.7 07/31/2024    HCT 41.1 07/31/2024    MCV 93.2 07/31/2024    RDW 12.0 07/31/2024     FLP:   Lab Results   Component Value Date    CHOL 146 12/16/2024    HDL 46 12/16/2024    LDL 76.00 12/16/2024    TRIG 118 12/16/2024    TOTALCHOLEST 3 12/16/2024     DM:   Lab Results   Component Value Date    HGBA1C 6.5 07/31/2024    .9 07/31/2024    CREATININE 0.83 12/16/2024    CREATRANDUR 39.9 (L) 07/31/2024     Thyroid:   Lab Results   Component Value Date    TSH 1.237 01/17/2023     LFTs:   Lab Results   Component Value Date    ALBUMIN 4.0 12/16/2024    AST 16 12/16/2024    ALT 16 12/16/2024    ALKPHOS 83 12/16/2024     Anemia:   Lab Results   Component Value Date    TDVZXOSV74 865 (H) 12/16/2024    FOLATE 27.7 12/16/2024       ASSESSMENT & PLAN:     T2DM  - Continue metformin 1000 bid and glyburide 5mg  daily   -previously attempted Ozempic and Victoza but patient was unable to tolerate secondary to GI upset and also states the medications were too expensive.   -A1c 6.5 at last visit, 6.9 today   -follows with outside ophthalmologist    -foot exam performed today: skin intact without lesions, ulcers, or calluses.  2+ pulses bilaterally.  Sensation intact bilaterally in all areas on microfilament exam.     HLD  - continue Atorvastatin 40  - last lipid panel 1/17/2023 shows elevated LDL.  Patient reports complience with current regimen.  Repeating lipid panel today for further follow-up.  May consider increasing atorvastatin to 80mg if LDL remains significantly elevated above goal  - patient attempting lifestyle modifications, will continue to monitor    HTN  -/77 today   -continue lisinopril 30mg     CKD II  Hypercalcemia   - BUN/Cr, microalbumin/creatinine ratio wnl on 1/17/2023   - checking vitamin D and PTH level today as calcium was mildly elevated on previous CMP.  Will recheck today     Depression - well controlled   -Continue Paxil 20  -has been on this medication since 2012 after her  passed away.  Had conversation with patient today about discontinuing as it is not intended to be long-term therapy.  Patient states she would feel more comfortable staying on the medication as she is worried about reoccurrence of depression and still struggles with personal issues that she believes may exacerbate her symptoms if she does not continue with medication.     Urgency Incontinence  Recurrent UTI's  -continue oxybutynin, patient endorses improvement in symptoms    -following with urology      Low back pain   Sciatica   -patient continuing to endorse lower back pain beginning approx 1 year ago that is episodic in nature    -lumbar x-ray showing narrowing at multiple levels but specially L1-L2 marginal osteophytes also identified at multiple levels as well as degenerative changes.  -seen in the ED earlier  this year for severe left-sided buttox pain with radiation into her lower leg.  Was given pain medications and had follow-up visit with Dr. Zamudio who counseled on conservative management and sent to physical therapy   -patient endorses improvement in symptoms today although states she does experience lower back pain episodically.    -counseled on conservative management as well as stretches to perform at home   -Has MRI lumbar spine scheduled 12/23/2024      Preventative Care  -Flu Vaccine: received flu vaccine at outside pharmacy 11/2024   -TDap Vaccine: 2019  -Herpes Zoster Vaccine: completed 2 dose shingles series today feb 2021  -Pneumovax: received pneumovax 20 on 1/17/2023   -Recent FIT Test: cologuard negative 5/24/2023   -Recent Colonoscopy: as above  -Lung Cancer Screening: never smoker  -DEXA Scan: negative 2/8/2023  -Recent Pap Smear: refusing   -Recent Mammogram: BIRADS 1 2/9/2023, reordered today    -Diabetic Foot exam: performed 5/9/2023  -Diabetic Eye Exam: fundus check completed in 05/2021, following opthalmology   -Diabetic Nephropathy Screening: no microalbuminuria in 5/2021, reordered today for next visit   -completed covid vaccinations and booster in dec 2021  -Hep C/HIV: ordered today     Lab work today   Return to clinic in 6 month(s).    Elena Mcgee MD  [facility], [facility]

## 2025-06-02 NOTE — PROGRESS NOTES
[facility] INTERNAL MEDICINE  OUTPATIENT OFFICE VISIT NOTE    SUBJECTIVE:      Chief Complaint: Follow-up, Numbness, and Back Pain     HPI: Ursula Sampson is a 69 y.o. yo female w/ PMH of T2DM, HLD, HTN, CKD Stage II who presents for follow-up. Reports continued left-sided buttox pain with radiation into her lower extremity. Patient recently completed Lumbar MRI that revealed mild to moderate disease throughout. Patient states she takes gabapentin 200 mg BID with no relief. She also states her left thigh goes numb sometimes at night  She reports improvement in symptoms since that visit but does states she continues to experience episodic lower back pain. Otherwise patient has no new acute complaints and reports good compliance with medication regimen. Last A1c 2024 was 6.9. A1c today 7.6. Repeat drawn today. /71. Last LDL 76 in 2024.     Past Medical History:  HTN, HLD, T2DM, CKD Stage 2    Past Surgical History:  Hx of cholecystectomy  Hx of D&C in    Hx of  1984    Right shoulder replacement in 2020     Family History:  Mother had hx colon cancer, diabetes, sick sinus syndrome, and dementia     Social History:   reports that she has never smoked. She has never been exposed to tobacco smoke. She has never used smokeless tobacco. She reports current alcohol use of about 2.0 standard drinks of alcohol per week. She reports that she does not use drugs.     Allergies:  is allergic to erythromycin and sulfamethoxazole-trimethoprim.     Home Medications:  Prior to Admission medications    Medication Sig Start Date End Date Taking? Authorizing Provider   albuterol (PROVENTIL) 2.5 mg /3 mL (0.083 %) nebulizer solution SMARTSIG:3 Milliliter(s) Via Nebulizer Every 4-6 Hours PRN 22  Yes Historical Provider   aspirin 81 MG Chew Take 81 mg by mouth Daily. 2/15/22  Yes Historical Provider   cetirizine (ZYRTEC) 10 MG tablet Take 10 tablets by mouth Daily. 21  Yes Historical Provider    glyBURIDE (DIABETA) 1.25 MG Tab Take 1 tablet (1.25 mg total) by mouth once daily. 10/27/22  Yes Elena Mcgee MD   Lactobacillus acidophilus 500 million cell Cap Take 1 capsule by mouth 2 (two) times a day. 5/12/21  Yes Historical Provider   mecobalamin, vitamin B12, (B12 ACTIVE) 1,000 mcg Chew Take 1,000 mcg by mouth Daily. 2/15/22  Yes Historical Provider   atorvastatin (LIPITOR) 40 MG tablet Take 40 mg by mouth every evening. 12/2/22   Historical Provider   lisinopriL (PRINIVIL,ZESTRIL) 30 MG tablet Take 1 tablet (30 mg total) by mouth once daily.  Patient not taking: Reported on 1/17/2023 8/23/22   Elena Mcgee MD   metFORMIN (GLUCOPHAGE) 1000 MG tablet Take 1 tablet (1,000 mg total) by mouth 2 (two) times daily.  Patient not taking: Reported on 1/17/2023 6/30/22   Danny Mcdonald,    paroxetine (PAXIL) 20 MG tablet Take 1 tablet (20 mg total) by mouth once daily.  Patient not taking: Reported on 1/17/2023 8/23/22   Elena Mcgee MD     Review of Systems   Constitutional:  Negative for weight loss.   Eyes:  Negative for blurred vision.   Respiratory:  Negative for cough and shortness of breath.    Cardiovascular:  Negative for chest pain and leg swelling.   Gastrointestinal:  Negative for abdominal pain, constipation, diarrhea, nausea and vomiting.   Genitourinary:  Negative for dysuria, frequency, hematuria and urgency.   Musculoskeletal:  Positive for back pain.   Neurological:  Negative for dizziness, tremors, seizures, weakness and headaches.   Endo/Heme/Allergies:  Negative for polydipsia.   Psychiatric/Behavioral:  The patient is not nervous/anxious.          OBJECTIVE:     Vital signs:   /71 (BP Location: Right arm, Patient Position: Sitting)   Pulse 65   Temp 97.5 °F (36.4 °C) (Oral)   Resp 18   Ht 5' (1.524 m)   Wt 88.7 kg (195 lb 9.6 oz)   SpO2 100%   BMI 38.20 kg/m²      Physical Examination:  General: Patient resting comfortably, in no acute distress   Eye: PERRLA, EOMI,  clear conjunctiva, eyelids normal  HENT: Head-normocephalic and atraumatic  Neck: full range of motion, no thyromegaly or lymphadenopathy, trachea midline, supple, no palpable thyroid nodules  Respiratory: clear to auscultation bilaterally without wheezes, rales, rhonchi  Cardiovascular: regular rate and rhythm without murmurs.  No gallops or rubs no JVD.  Capillary refill within normal limits.  Gastrointestinal: soft, non-tender, non-distended with normal bowel sounds, without masses to palpation  Genitourinary: no CVA tenderness to palpation  Musculoskeletal: full range of motion of all extremities without limitation or discomfort.    Integumentary: no rashes or skin lesions present  Neurologic: no signs of peripheral neurological deficit, motor/sensory function intact  Psychiatric:  alert and oriented, cognitive function intact, cooperative with exam, good eye contact, judgement and insight intact, mood and affect full range.   Foot exam: no lesions or ulcers present, sensation intact, pulses 2+, toenails appear well maintained, skin is not dry or cracked    Labs:  CMP:   Lab Results   Component Value Date    CALCIUM 10.1 12/16/2024    ALBUMIN 4.0 12/16/2024    PROT 7.4 12/16/2024     12/16/2024    K 4.5 12/16/2024    CO2 29 12/16/2024     12/16/2024    BUN 16.7 12/16/2024    CREATININE 0.83 12/16/2024    ALKPHOS 83 12/16/2024    ALT 16 12/16/2024    AST 16 12/16/2024    BILITOT 0.5 12/16/2024      CBC:   Lab Results   Component Value Date    WBC 10.21 07/31/2024    HGB 13.7 07/31/2024    HCT 41.1 07/31/2024    MCV 93.2 07/31/2024    RDW 12.0 07/31/2024     FLP:   Lab Results   Component Value Date    CHOL 146 12/16/2024    HDL 46 12/16/2024    LDL 76.00 12/16/2024    TRIG 118 12/16/2024    TOTALCHOLEST 3 12/16/2024     DM:   Lab Results   Component Value Date    HGBA1C 6.5 07/31/2024    .9 07/31/2024    CREATININE 0.83 12/16/2024    CREATRANDUR 39.9 (L) 07/31/2024     Thyroid:   Lab Results    Component Value Date    TSH 1.237 01/17/2023     LFTs:   Lab Results   Component Value Date    ALBUMIN 4.0 12/16/2024    AST 16 12/16/2024    ALT 16 12/16/2024    ALKPHOS 83 12/16/2024     Anemia:   Lab Results   Component Value Date    [id number] 865 (H) 12/16/2024    FOLATE 27.7 12/16/2024       ASSESSMENT & PLAN:     T2DM  - Continue metformin 1000 bid and glyburide 5mg daily   -previously attempted Ozempic and Victoza but patient was unable to tolerate secondary to GI upset and also states the medications were too expensive.   -Will attempt Mounjaro 2.5 at this time and titrate up to 5 if patient can tolerate   -A1c 6.9 at last visit, 7.6 today   -follows with outside ophthalmologist      HLD  - continue Atorvastatin 40  - last lipid panel 12/2024 shows improved LDL at 76.  Patient reports complience with current regimen.  Repeating lipid panel prior to next follow up.  May consider increasing atorvastatin to 80mg if LDL remains significantly elevated above goal  - patient attempting lifestyle modifications, will continue to monitor    HTN  -/77 today   -continue lisinopril 30mg     CKD II  Hypercalcemia   - BUN/Cr, microalbumin/creatinine ratio wnl on 1/17/2023   - checking vitamin D and PTH level today as calcium was mildly elevated on previous CMP.  Will recheck today     Depression - well controlled   -Continue Paxil 20  -has been on this medication since 2012 after her  passed away.  Had conversation with patient today about discontinuing as it is not intended to be long-term therapy.  Patient states she would feel more comfortable staying on the medication as she is worried about reoccurrence of depression and still struggles with personal issues that she believes may exacerbate her symptoms if she does not continue with medication.     Urgency Incontinence  Recurrent UTI's  -continue oxybutynin, patient endorses improvement in symptoms    -following with urology      Low back pain    Sciatica   -patient continuing to endorse lower back pain beginning approx 1 year ago that is episodic in nature    -Lumbar Mri showed mostly levels having mild to moderate disease throughout  -patient declines surgical eval or PT at this time   -patient endorses improvement in symptoms today although states she does experience lower back pain episodically.    -counseled on conservative management as well as stretches to perform at home   -Has MRI lumbar spine scheduled 12/23/2024      Preventative Care  -Flu Vaccine: received flu vaccine at outside pharmacy 11/2024   -TDap Vaccine: 2019  -Herpes Zoster Vaccine: completed 2 dose shingles series today feb 2021  -Pneumovax: received pneumovax 20 on 1/17/2023   -Recent FIT Test: cologuard negative 5/24/2023   -Recent Colonoscopy: as above  -Lung Cancer Screening: never smoker  -DEXA Scan: negative 2/8/2023  -Recent Pap Smear: refusing   -Recent Mammogram: BIRADS 1 2/9/2023, reordered today    -Diabetic Foot exam: performed 12/2024  -Diabetic Eye Exam: fundus check completed in 05/2021, following opthalmology   -Diabetic Nephropathy Screening: no microalbuminuria in 5/2021, reordered today for next visit   -completed covid vaccinations and booster in dec 2021  -Hep C/HIV: ordered today     Lab work today   Return to clinic in 3 month(s).    Elliot Gu DO  [facility], PGY-1

## 2025-06-19 ENCOUNTER — TELEPHONE (OUTPATIENT)
Dept: INTERNAL MEDICINE | Facility: CLINIC | Age: 70
End: 2025-06-19
Payer: COMMERCIAL

## 2025-06-19 DIAGNOSIS — Z79.4 TYPE 2 DIABETES MELLITUS WITHOUT COMPLICATION, WITH LONG-TERM CURRENT USE OF INSULIN: Primary | ICD-10-CM

## 2025-06-19 DIAGNOSIS — E11.9 TYPE 2 DIABETES MELLITUS WITHOUT COMPLICATION, WITH LONG-TERM CURRENT USE OF INSULIN: Primary | ICD-10-CM

## 2025-06-19 NOTE — TELEPHONE ENCOUNTER
Patient calling requesting a Rx for Trulicity. The Mounjaro is too expensive. She states she can get Trulicity at [facility] at a cheaper cost. Please review and advise if patient needs to  Rx here at the office and she can take to that Center? Thanks.

## 2025-06-25 RX ORDER — DULAGLUTIDE 0.75 MG/.5ML
INJECTION, SOLUTION SUBCUTANEOUS
Qty: 14 PEN | Refills: 0 | Status: SHIPPED | OUTPATIENT
Start: 2025-06-25 | End: 2025-08-27

## 2025-07-21 ENCOUNTER — OFFICE VISIT (OUTPATIENT)
Dept: URGENT CARE | Facility: CLINIC | Age: 70
End: 2025-07-21
Payer: COMMERCIAL

## 2025-07-21 ENCOUNTER — APPOINTMENT (OUTPATIENT)
Dept: ADMINISTRATIVE | Facility: HOSPITAL | Age: 70
End: 2025-07-21
Payer: COMMERCIAL

## 2025-07-21 VITALS
WEIGHT: 195 LBS | DIASTOLIC BLOOD PRESSURE: 69 MMHG | HEIGHT: 59 IN | BODY MASS INDEX: 39.31 KG/M2 | SYSTOLIC BLOOD PRESSURE: 146 MMHG | TEMPERATURE: 98 F | RESPIRATION RATE: 18 BRPM | HEART RATE: 64 BPM | OXYGEN SATURATION: 98 %

## 2025-07-21 DIAGNOSIS — M25.552 LEFT HIP PAIN: ICD-10-CM

## 2025-07-21 DIAGNOSIS — M54.32 SCIATICA OF LEFT SIDE: Primary | ICD-10-CM

## 2025-07-21 DIAGNOSIS — M54.42 ACUTE LEFT-SIDED LOW BACK PAIN WITH LEFT-SIDED SCIATICA: ICD-10-CM

## 2025-07-21 PROCEDURE — 99213 OFFICE O/P EST LOW 20 MIN: CPT | Mod: S$PBB,,,

## 2025-07-21 PROCEDURE — 63600175 PHARM REV CODE 636 W HCPCS

## 2025-07-21 PROCEDURE — 99215 OFFICE O/P EST HI 40 MIN: CPT | Mod: PBBFAC

## 2025-07-21 RX ORDER — PREDNISONE 10 MG/1
10 TABLET ORAL 2 TIMES DAILY
Qty: 10 TABLET | Refills: 0 | Status: SHIPPED | OUTPATIENT
Start: 2025-07-21 | End: 2025-07-26

## 2025-07-21 RX ORDER — KETOROLAC TROMETHAMINE 30 MG/ML
30 INJECTION, SOLUTION INTRAMUSCULAR; INTRAVENOUS
Status: COMPLETED | OUTPATIENT
Start: 2025-07-21 | End: 2025-07-21

## 2025-07-21 RX ORDER — DEXAMETHASONE SODIUM PHOSPHATE 4 MG/ML
8 INJECTION, SOLUTION INTRA-ARTICULAR; INTRALESIONAL; INTRAMUSCULAR; INTRAVENOUS; SOFT TISSUE
Status: COMPLETED | OUTPATIENT
Start: 2025-07-21 | End: 2025-07-21

## 2025-07-21 RX ADMIN — KETOROLAC TROMETHAMINE 30 MG: 30 INJECTION, SOLUTION INTRAMUSCULAR at 02:07

## 2025-07-21 RX ADMIN — DEXAMETHASONE SODIUM PHOSPHATE 8 MG: 4 INJECTION, SOLUTION INTRA-ARTICULAR; INTRALESIONAL; INTRAMUSCULAR; INTRAVENOUS; SOFT TISSUE at 02:07

## 2025-07-21 NOTE — PROGRESS NOTES
"Subjective:       Patient ID: Ursula Sampson is a 69 y.o. female.    Vitals:  height is 4' 11" (1.499 m) and weight is 88.5 kg (195 lb). Her temperature is 97.9 °F (36.6 °C). Her blood pressure is 146/69 (abnormal) and her pulse is 64. Her respiration is 18 and oxygen saturation is 98%.     Chief Complaint: Pain (Chronic Lt hip pain appx 1-2 years. States constant since starting.)    69-year-old female reports to the clinic with complaints of chronic left hip pain and chronic left-sided sciatica that began about 2 years ago.  Patient states that the pain is constant since starting and takes gabapentin and tizanidine for the pain.  Patient states she has seen her PCP about this issue and states that her PCP wanted to refer her to a specialist but she did not want the referral but now thinks she will discussed getting a referral with her PCP at her next appointment.    All other systems are negative    Chart reviewed    Objective:   Physical Exam   Constitutional: She appears well-developed.  Non-toxic appearance. She does not appear ill. No distress.   Cardiovascular: Regular rhythm.   Pulmonary/Chest: Effort normal and breath sounds normal.   Abdominal: Normal appearance. She exhibits no distension. Soft. There is no abdominal tenderness.   Musculoskeletal:      Left hip: She exhibits decreased range of motion, decreased strength and tenderness. She exhibits no bony tenderness and no crepitus.      Lumbar back: She exhibits decreased range of motion, tenderness and spasm.      Left upper leg: She exhibits tenderness.   Neurological: no focal deficit. She is alert. She displays no weakness.   Skin: Skin is warm, dry and not diaphoretic. Capillary refill takes less than 2 seconds.   Psychiatric: Her behavior is normal. Mood normal.   Nursing note and vitals reviewed.      Assessment:     1. Sciatica of left side    2. Left hip pain    3. Acute left-sided low back pain with left-sided sciatica            Plan:   We " will give dexamethasone 8 mg IM at today's visit  We will give Toradol 30 mg IM at today's visit  Begin taking prednisone twice daily for the next 5 days and complete full course of medication  Please read patient education material.  Please take medications as discussed and consider potential side effects. Consider heat, over-the-counter topical analgesics, stretches.    Return to urgent care if symptoms are not improving in 3-5 days, immediately if new or worsening symptoms develop.  Follow up with your primary provider.       Sciatica of left side  -     dexAMETHasone injection 8 mg  -     ketorolac injection 30 mg  -     predniSONE (DELTASONE) 10 MG tablet; Take 1 tablet (10 mg total) by mouth 2 (two) times daily. for 5 days  Dispense: 10 tablet; Refill: 0    Left hip pain    Acute left-sided low back pain with left-sided sciatica        Please note: This chart was completed via voice to text dictation. It may contain typographical/word recognition errors. If there are any questions, please contact the provider for final clarification.